# Patient Record
Sex: MALE | Race: BLACK OR AFRICAN AMERICAN | NOT HISPANIC OR LATINO
[De-identification: names, ages, dates, MRNs, and addresses within clinical notes are randomized per-mention and may not be internally consistent; named-entity substitution may affect disease eponyms.]

---

## 2017-01-18 ENCOUNTER — APPOINTMENT (OUTPATIENT)
Dept: WOUND CARE | Facility: CLINIC | Age: 37
End: 2017-01-18

## 2017-01-27 ENCOUNTER — APPOINTMENT (OUTPATIENT)
Dept: WOUND CARE | Facility: CLINIC | Age: 37
End: 2017-01-27

## 2017-02-03 ENCOUNTER — APPOINTMENT (OUTPATIENT)
Dept: WOUND CARE | Facility: CLINIC | Age: 37
End: 2017-02-03

## 2017-03-03 ENCOUNTER — APPOINTMENT (OUTPATIENT)
Dept: WOUND CARE | Facility: CLINIC | Age: 37
End: 2017-03-03

## 2017-03-03 DIAGNOSIS — L97.521 NON-PRESSURE CHRONIC ULCER OF OTHER PART OF LEFT FOOT LIMITED TO BREAKDOWN OF SKIN: ICD-10-CM

## 2017-04-07 ENCOUNTER — APPOINTMENT (OUTPATIENT)
Dept: WOUND CARE | Facility: CLINIC | Age: 37
End: 2017-04-07

## 2017-04-07 DIAGNOSIS — E11.621 TYPE 2 DIABETES MELLITUS WITH FOOT ULCER: ICD-10-CM

## 2017-04-07 DIAGNOSIS — L97.512 NON-PRESSURE CHRONIC ULCER OF OTHER PART OF RIGHT FOOT WITH FAT LAYER EXPOSED: ICD-10-CM

## 2017-04-07 DIAGNOSIS — L97.409 TYPE 2 DIABETES MELLITUS WITH FOOT ULCER: ICD-10-CM

## 2017-04-19 PROBLEM — L97.521 CHRONIC FOOT ULCER, LEFT, LIMITED TO BREAKDOWN OF SKIN: Status: ACTIVE | Noted: 2017-04-19

## 2017-09-01 ENCOUNTER — APPOINTMENT (OUTPATIENT)
Dept: MRI IMAGING | Facility: CLINIC | Age: 37
End: 2017-09-01
Payer: COMMERCIAL

## 2017-09-01 ENCOUNTER — OUTPATIENT (OUTPATIENT)
Dept: OUTPATIENT SERVICES | Facility: HOSPITAL | Age: 37
LOS: 1 days | End: 2017-09-01
Payer: COMMERCIAL

## 2017-09-01 DIAGNOSIS — Z00.8 ENCOUNTER FOR OTHER GENERAL EXAMINATION: ICD-10-CM

## 2017-09-01 PROCEDURE — 73720 MRI LWR EXTREMITY W/O&W/DYE: CPT

## 2017-09-01 PROCEDURE — 73720 MRI LWR EXTREMITY W/O&W/DYE: CPT | Mod: 26,RT

## 2017-09-01 PROCEDURE — 82565 ASSAY OF CREATININE: CPT

## 2017-09-01 PROCEDURE — A9585: CPT

## 2020-05-29 ENCOUNTER — RX RENEWAL (OUTPATIENT)
Age: 40
End: 2020-05-29

## 2021-01-10 ENCOUNTER — TRANSCRIPTION ENCOUNTER (OUTPATIENT)
Age: 41
End: 2021-01-10

## 2021-01-11 ENCOUNTER — INPATIENT (INPATIENT)
Facility: HOSPITAL | Age: 41
LOS: 0 days | Discharge: HOME | DRG: 617 | End: 2021-01-12
Attending: INTERNAL MEDICINE | Admitting: INTERNAL MEDICINE
Payer: COMMERCIAL

## 2021-01-11 ENCOUNTER — TRANSCRIPTION ENCOUNTER (OUTPATIENT)
Age: 41
End: 2021-01-11

## 2021-01-11 VITALS
HEART RATE: 112 BPM | HEIGHT: 74 IN | RESPIRATION RATE: 14 BRPM | WEIGHT: 265 LBS | TEMPERATURE: 98 F | OXYGEN SATURATION: 99 % | SYSTOLIC BLOOD PRESSURE: 226 MMHG | DIASTOLIC BLOOD PRESSURE: 118 MMHG

## 2021-01-11 DIAGNOSIS — E11.621 TYPE 2 DIABETES MELLITUS WITH FOOT ULCER: ICD-10-CM

## 2021-01-11 LAB
ANION GAP SERPL CALC-SCNC: 17 MMOL/L — SIGNIFICANT CHANGE UP (ref 5–17)
APTT BLD: 32.7 SEC — SIGNIFICANT CHANGE UP (ref 27.5–35.5)
BLD GP AB SCN SERPL QL: NEGATIVE — SIGNIFICANT CHANGE UP
BUN SERPL-MCNC: 19 MG/DL — SIGNIFICANT CHANGE UP (ref 7–23)
CALCIUM SERPL-MCNC: 9.4 MG/DL — SIGNIFICANT CHANGE UP (ref 8.4–10.5)
CHLORIDE SERPL-SCNC: 96 MMOL/L — SIGNIFICANT CHANGE UP (ref 96–108)
CO2 SERPL-SCNC: 23 MMOL/L — SIGNIFICANT CHANGE UP (ref 22–31)
CREAT SERPL-MCNC: 1.11 MG/DL — SIGNIFICANT CHANGE UP (ref 0.5–1.3)
GLUCOSE BLDC GLUCOMTR-MCNC: 80 MG/DL — SIGNIFICANT CHANGE UP (ref 70–99)
GLUCOSE BLDC GLUCOMTR-MCNC: 99 MG/DL — SIGNIFICANT CHANGE UP (ref 70–99)
GLUCOSE SERPL-MCNC: 102 MG/DL — HIGH (ref 70–99)
HCT VFR BLD CALC: 48.6 % — SIGNIFICANT CHANGE UP (ref 39–50)
HGB BLD-MCNC: 16.6 G/DL — SIGNIFICANT CHANGE UP (ref 13–17)
INR BLD: 0.97 RATIO — SIGNIFICANT CHANGE UP (ref 0.88–1.16)
MCHC RBC-ENTMCNC: 30.2 PG — SIGNIFICANT CHANGE UP (ref 27–34)
MCHC RBC-ENTMCNC: 34.2 GM/DL — SIGNIFICANT CHANGE UP (ref 32–36)
MCV RBC AUTO: 88.5 FL — SIGNIFICANT CHANGE UP (ref 80–100)
NRBC # BLD: 0 /100 WBCS — SIGNIFICANT CHANGE UP (ref 0–0)
PLATELET # BLD AUTO: 355 K/UL — SIGNIFICANT CHANGE UP (ref 150–400)
POTASSIUM SERPL-MCNC: 3.9 MMOL/L — SIGNIFICANT CHANGE UP (ref 3.5–5.3)
POTASSIUM SERPL-SCNC: 3.9 MMOL/L — SIGNIFICANT CHANGE UP (ref 3.5–5.3)
PROTHROM AB SERPL-ACNC: 11.6 SEC — SIGNIFICANT CHANGE UP (ref 10.6–13.6)
RBC # BLD: 5.49 M/UL — SIGNIFICANT CHANGE UP (ref 4.2–5.8)
RBC # FLD: 12.5 % — SIGNIFICANT CHANGE UP (ref 10.3–14.5)
RH IG SCN BLD-IMP: POSITIVE — SIGNIFICANT CHANGE UP
SARS-COV-2 RNA SPEC QL NAA+PROBE: SIGNIFICANT CHANGE UP
SODIUM SERPL-SCNC: 136 MMOL/L — SIGNIFICANT CHANGE UP (ref 135–145)
WBC # BLD: 10.43 K/UL — SIGNIFICANT CHANGE UP (ref 3.8–10.5)
WBC # FLD AUTO: 10.43 K/UL — SIGNIFICANT CHANGE UP (ref 3.8–10.5)

## 2021-01-11 PROCEDURE — 71045 X-RAY EXAM CHEST 1 VIEW: CPT | Mod: 26

## 2021-01-11 PROCEDURE — 99285 EMERGENCY DEPT VISIT HI MDM: CPT

## 2021-01-11 PROCEDURE — 93010 ELECTROCARDIOGRAM REPORT: CPT

## 2021-01-11 PROCEDURE — 73620 X-RAY EXAM OF FOOT: CPT | Mod: 26,RT

## 2021-01-11 PROCEDURE — 73630 X-RAY EXAM OF FOOT: CPT | Mod: 26,RT

## 2021-01-11 RX ORDER — PIPERACILLIN AND TAZOBACTAM 4; .5 G/20ML; G/20ML
3.38 INJECTION, POWDER, LYOPHILIZED, FOR SOLUTION INTRAVENOUS EVERY 8 HOURS
Refills: 0 | Status: DISCONTINUED | OUTPATIENT
Start: 2021-01-11 | End: 2021-01-11

## 2021-01-11 RX ORDER — OXYCODONE AND ACETAMINOPHEN 5; 325 MG/1; MG/1
1 TABLET ORAL EVERY 4 HOURS
Refills: 0 | Status: DISCONTINUED | OUTPATIENT
Start: 2021-01-11 | End: 2021-01-12

## 2021-01-11 RX ORDER — DEXTROSE 50 % IN WATER 50 %
15 SYRINGE (ML) INTRAVENOUS ONCE
Refills: 0 | Status: DISCONTINUED | OUTPATIENT
Start: 2021-01-11 | End: 2021-01-12

## 2021-01-11 RX ORDER — DEXTROSE 50 % IN WATER 50 %
25 SYRINGE (ML) INTRAVENOUS ONCE
Refills: 0 | Status: DISCONTINUED | OUTPATIENT
Start: 2021-01-11 | End: 2021-01-12

## 2021-01-11 RX ORDER — DEXTROSE 50 % IN WATER 50 %
12.5 SYRINGE (ML) INTRAVENOUS ONCE
Refills: 0 | Status: DISCONTINUED | OUTPATIENT
Start: 2021-01-11 | End: 2021-01-12

## 2021-01-11 RX ORDER — SODIUM CHLORIDE 9 MG/ML
1000 INJECTION, SOLUTION INTRAVENOUS
Refills: 0 | Status: DISCONTINUED | OUTPATIENT
Start: 2021-01-11 | End: 2021-01-11

## 2021-01-11 RX ORDER — METFORMIN HYDROCHLORIDE 850 MG/1
1 TABLET ORAL
Qty: 0 | Refills: 0 | DISCHARGE

## 2021-01-11 RX ORDER — INSULIN LISPRO 100/ML
VIAL (ML) SUBCUTANEOUS
Refills: 0 | Status: DISCONTINUED | OUTPATIENT
Start: 2021-01-11 | End: 2021-01-12

## 2021-01-11 RX ORDER — METOPROLOL TARTRATE 50 MG
25 TABLET ORAL
Refills: 0 | Status: DISCONTINUED | OUTPATIENT
Start: 2021-01-11 | End: 2021-01-12

## 2021-01-11 RX ORDER — PIPERACILLIN AND TAZOBACTAM 4; .5 G/20ML; G/20ML
3.38 INJECTION, POWDER, LYOPHILIZED, FOR SOLUTION INTRAVENOUS EVERY 8 HOURS
Refills: 0 | Status: DISCONTINUED | OUTPATIENT
Start: 2021-01-11 | End: 2021-01-12

## 2021-01-11 RX ORDER — ATORVASTATIN CALCIUM 80 MG/1
10 TABLET, FILM COATED ORAL AT BEDTIME
Refills: 0 | Status: DISCONTINUED | OUTPATIENT
Start: 2021-01-11 | End: 2021-01-12

## 2021-01-11 RX ORDER — ENOXAPARIN SODIUM 100 MG/ML
40 INJECTION SUBCUTANEOUS DAILY
Refills: 0 | Status: DISCONTINUED | OUTPATIENT
Start: 2021-01-11 | End: 2021-01-11

## 2021-01-11 RX ORDER — ACETAMINOPHEN 500 MG
650 TABLET ORAL EVERY 6 HOURS
Refills: 0 | Status: DISCONTINUED | OUTPATIENT
Start: 2021-01-11 | End: 2021-01-12

## 2021-01-11 RX ORDER — INSULIN LISPRO 100/ML
VIAL (ML) SUBCUTANEOUS AT BEDTIME
Refills: 0 | Status: DISCONTINUED | OUTPATIENT
Start: 2021-01-11 | End: 2021-01-12

## 2021-01-11 RX ORDER — GLUCAGON INJECTION, SOLUTION 0.5 MG/.1ML
1 INJECTION, SOLUTION SUBCUTANEOUS ONCE
Refills: 0 | Status: DISCONTINUED | OUTPATIENT
Start: 2021-01-11 | End: 2021-01-12

## 2021-01-11 RX ORDER — FENTANYL CITRATE 50 UG/ML
25 INJECTION INTRAVENOUS
Refills: 0 | Status: DISCONTINUED | OUTPATIENT
Start: 2021-01-11 | End: 2021-01-11

## 2021-01-11 RX ORDER — VANCOMYCIN HCL 1 G
1000 VIAL (EA) INTRAVENOUS ONCE
Refills: 0 | Status: COMPLETED | OUTPATIENT
Start: 2021-01-11 | End: 2021-01-11

## 2021-01-11 RX ORDER — EMPAGLIFLOZIN 10 MG/1
1 TABLET, FILM COATED ORAL
Qty: 0 | Refills: 0 | DISCHARGE

## 2021-01-11 RX ORDER — SODIUM CHLORIDE 9 MG/ML
1000 INJECTION, SOLUTION INTRAVENOUS
Refills: 0 | Status: DISCONTINUED | OUTPATIENT
Start: 2021-01-11 | End: 2021-01-12

## 2021-01-11 RX ORDER — ONDANSETRON 8 MG/1
4 TABLET, FILM COATED ORAL ONCE
Refills: 0 | Status: DISCONTINUED | OUTPATIENT
Start: 2021-01-11 | End: 2021-01-11

## 2021-01-11 RX ORDER — LOSARTAN POTASSIUM 100 MG/1
50 TABLET, FILM COATED ORAL DAILY
Refills: 0 | Status: DISCONTINUED | OUTPATIENT
Start: 2021-01-11 | End: 2021-01-12

## 2021-01-11 RX ORDER — DULAGLUTIDE 4.5 MG/.5ML
0 INJECTION, SOLUTION SUBCUTANEOUS
Qty: 0 | Refills: 0 | DISCHARGE

## 2021-01-11 RX ORDER — FENTANYL CITRATE 50 UG/ML
50 INJECTION INTRAVENOUS
Refills: 0 | Status: DISCONTINUED | OUTPATIENT
Start: 2021-01-11 | End: 2021-01-11

## 2021-01-11 RX ORDER — PIPERACILLIN AND TAZOBACTAM 4; .5 G/20ML; G/20ML
3.38 INJECTION, POWDER, LYOPHILIZED, FOR SOLUTION INTRAVENOUS ONCE
Refills: 0 | Status: COMPLETED | OUTPATIENT
Start: 2021-01-11 | End: 2021-01-11

## 2021-01-11 RX ORDER — LOSARTAN POTASSIUM 100 MG/1
50 TABLET, FILM COATED ORAL ONCE
Refills: 0 | Status: COMPLETED | OUTPATIENT
Start: 2021-01-11 | End: 2021-01-11

## 2021-01-11 RX ADMIN — SODIUM CHLORIDE 75 MILLILITER(S): 9 INJECTION, SOLUTION INTRAVENOUS at 21:35

## 2021-01-11 RX ADMIN — LOSARTAN POTASSIUM 50 MILLIGRAM(S): 100 TABLET, FILM COATED ORAL at 10:43

## 2021-01-11 RX ADMIN — PIPERACILLIN AND TAZOBACTAM 200 GRAM(S): 4; .5 INJECTION, POWDER, LYOPHILIZED, FOR SOLUTION INTRAVENOUS at 10:43

## 2021-01-11 RX ADMIN — Medication 25 MILLIGRAM(S): at 17:29

## 2021-01-11 RX ADMIN — PIPERACILLIN AND TAZOBACTAM 25 GRAM(S): 4; .5 INJECTION, POWDER, LYOPHILIZED, FOR SOLUTION INTRAVENOUS at 17:43

## 2021-01-11 RX ADMIN — Medication 250 MILLIGRAM(S): at 12:05

## 2021-01-11 NOTE — DISCHARGE NOTE PROVIDER - CARE PROVIDER_API CALL
Isaiah Shetty (DPM)  Podiatric Medicine and Surgery  [address] [place] [address]  Phone: [phone]  Fax: [phone]  Follow Up Time:    Isaiah Shetty (DPM)  Podiatric Medicine and Surgery  [address] [facility], [place] [address]  Phone: [phone]  Fax: [phone]  Follow Up Time:     Markus Ann  CARDIOVASCULAR DISEASE  [address] [facility], [address]  [place] [address]  Phone: [phone]  Fax: [phone]  Follow Up Time:

## 2021-01-11 NOTE — DISCHARGE NOTE PROVIDER - HOSPITAL COURSE
41 y/o M w/ chronic R foot sub metatarsal 2 wound, s/p R foot 2nd metatarsal head resection ,sutures intact w/ no dehiscence, no hematoma, no drainage, stable w/ no acute signs of infection   - per intraoperative findings, low concern for residual infection  - pod stable for discharge  Pt. is cleared for discharge with close outpatient follow up.

## 2021-01-11 NOTE — PACU DISCHARGE NOTE - COMMENTS
BP within patient's typical range. No CP, SOB, FND. Primary team evaluated pt's elevated bp. No need for aggressive tx prior to PACU discharge, given patient's baseline pressure.

## 2021-01-11 NOTE — DISCHARGE NOTE PROVIDER - NSDCCPCAREPLAN_GEN_ALL_CORE_FT
PRINCIPAL DISCHARGE DIAGNOSIS  Diagnosis: Diabetic foot ulcer  Assessment and Plan of Treatment: s/p resection  cleared by podiarty         SECONDARY DISCHARGE DIAGNOSES  Diagnosis: Diabetes mellitus  Assessment and Plan of Treatment: HgA1C this admission.  Make sure you get your HgA1c checked every three months.  If you take oral diabetes medications, check your blood glucose two times a day.  If you take insulin, check your blood glucose before meals and at bedtime.  It's important not to skip any meals.  Keep a log of your blood glucose results and always take it with you to your doctor appointments.  Keep a list of your current medications including injectables and over the counter medications and bring this medication list with you to all your doctor appointments.  If you have not seen your ophthalmologist this year call for appointment.  Check your feet daily for redness, sores, or openings. Do not self treat. If no improvement in two days call your primary care physician for an appointment.  Low blood sugar (hypoglycemia) is a blood sugar below 70mg/dl. Check your blood sugar if you feel signs/symptoms of hypoglycemia. If your blood sugar is below 70 take 15 grams of carbohydrates (ex 4 oz of apple juice, 3-4 glucose tablets, or 4-6 oz of regular soda) wait 15 minutes and repeat blood sugar to make sure it comes up above 70.  If your blood sugar is above 70 and you are due for a meal, have a meal.  If you are not due for a meal have a snack.  This snack helps keeps your blood sugar at a safe range.      Diagnosis: Hypertension  Assessment and Plan of Treatment: Follow up with your medical doctor to establish long term blood pressure treatment goals.

## 2021-01-11 NOTE — H&P ADULT - ASSESSMENT
41 y/o M with pmhx of DM2 and HTN. Pt present for surgery for an ulcer at the distal tip of his R 2nd metatarsal bone which is concerning for osteomyelitis. Pt was on outpatient cefadroxil. Pt says this ulcer has been present for at least 5 years. Pt denies any recent fevers, chills, nausea, vomiting, chest pain, sob. Pt denies any purulent discharge from ulcer site. Pt has not taken his hypertensive meds in 2 days due to insurance issues (Losartan 50mg QD). Pt's PCP is Dr. Ryne Doll. Pt's DPM is Dr. Isaiah Shetty who recommends admission to Dr. Ann for this hospital visit.  pt denies of any chest pain, sob with no hx of cad.  htn, will adjust bp meds add beta blocker/losartan  continue fs with coverage  abx  echo check wall motion  dvt prophylaxis  will add lipitoe  tsh  lipid panel 39 y/o M with pmhx of DM2 and HTN. Pt present for surgery for an ulcer at the distal tip of his R 2nd metatarsal bone which is concerning for osteomyelitis. Pt was on outpatient cefadroxil. Pt says this ulcer has been present for at least 5 years. Pt denies any recent fevers, chills, nausea, vomiting, chest pain, sob. Pt denies any purulent discharge from ulcer site. Pt has not taken his hypertensive meds in 2 days due to insurance issues (Losartan 50mg QD).  pt denies of any chest pain, sob with no hx of cad,   htn, will adjust bp meds add beta blocker/losartan  continue fs with coverage  abx  dvt prophylaxis  will add Lipitor  tsh  lipid panel  pt is clear for surgery, with mild  to md risk  continue and observe bp closely

## 2021-01-11 NOTE — ED ADULT NURSE NOTE - OBJECTIVE STATEMENT
39 y/o M pt w/ pmh of DM, HTN, present to ED from podiatry for surgery for possible osteomyelitis, pt has diabetic ulcer to bottom right foot, which is not healing, bone is sticking out, schedule to have surgery to remove right 2nd metatarsal bone, on exam pt A&Ox3, breathing spontaneous, unlabored w/o distress on room air, NAD, denies pain, fever, chills, numbness, tingling, wound wrapped, pt hypertensive 206/106, b/p meds given, seen and eval by MD, reinaldo placed, labs drawn and sent

## 2021-01-11 NOTE — ED PROVIDER NOTE - CLINICAL SUMMARY MEDICAL DECISION MAKING FREE TEXT BOX
juany -41 yo m with ho dm ( controlled with meds ) fs today 100's - with nonhealing foot ulcer no fever no chills - no change in pain - sent in for surgery and possible debridement - consult podiatry pt on po abx - change to IV - xr r/o gas no suspicion based on exam no crepitus no dc, minimal redness - ttp - xr r/o osteo

## 2021-01-11 NOTE — BRIEF OPERATIVE NOTE - OPERATION/FINDINGS
10cc EBL   Low concern residual infection  Low concern viability   Strong healthy appearing metatarsal bone

## 2021-01-11 NOTE — CONSULT NOTE ADULT - ASSESSMENT
39 y/o M     h/o HTN/  non complaint with meds. pcd/  c/c  right foot ulcer    w/ chronic R foot , 2nd toe, sub metatarsal 2wound      - R foot sub met   ulcer , which is c hronic non healing wound with likely underlying OM  on zosyn   for  resection of right foot, 2nd MT head on 1/11   sbp  is 203/106    on bp meds, on lopressor/ cozaar  follow  bp  curve  on statin   dvt ppx/  echo     39 y/o M     h/o HTN/  non complaint with meds. pcd/  c/c  right foot ulcer    w/ chronic R foot , 2nd toe, sub metatarsal 2wound      - R foot sub met   ulcer , which is c hronic non healing wound with likely underlying OM  on zosyn   for  resection of right foot, 2nd MT head on 1/11   sbp  is 203/106    on bp meds, on lopressor/ cozaar  follow  bp  curve  if sbp is Under  160, then  cleared  for OR  on statin   dvt ppx/  echo

## 2021-01-11 NOTE — DISCHARGE NOTE PROVIDER - NSDCMRMEDTOKEN_GEN_ALL_CORE_FT
cefadroxil 500 mg oral capsule: 1 tab(s) orally 2 times a day  Jardiance 25 mg oral tablet: 1 tab(s) orally once a day  losartan 50 mg oral tablet: 1 tab(s) orally once a day  NOTE: PHARMACY HAD 100MG AS PER PATIENT 50MG.  metFORMIN 1000 mg oral tablet: 1 tab(s) orally 2 times a day  Trulicity Pen 1.5 mg/0.5 mL subcutaneous solution: inject once a week on sundays as directed   atorvastatin 10 mg oral tablet: 1 tab(s) orally once a day (at bedtime)  hydroCHLOROthiazide 12.5 mg oral capsule: 1 cap(s) orally once a day   Jardiance 25 mg oral tablet: 1 tab(s) orally once a day  losartan 100 mg oral tablet: 1 tab(s) orally once a day   losartan 50 mg oral tablet: 1 tab(s) orally once a day  NOTE: PHARMACY HAD 100MG AS PER PATIENT 50MG.  metFORMIN 1000 mg oral tablet: 1 tab(s) orally 2 times a day  metoprolol tartrate 25 mg oral tablet: 1 tab(s) orally 2 times a day  Trulicity Pen 1.5 mg/0.5 mL subcutaneous solution: inject once a week on sundays as directed   atorvastatin 10 mg oral tablet: 1 tab(s) orally once a day (at bedtime)  Jardiance 25 mg oral tablet: 1 tab(s) orally once a day  losartan-hydrochlorothiazide 100 mg-25 mg oral tablet: 1 tab(s) orally once a day   metFORMIN 1000 mg oral tablet: 1 tab(s) orally 2 times a day  Metoprolol Succinate ER 50 mg oral tablet, extended release: 1 tab(s) orally once a day   Trulicity Pen 1.5 mg/0.5 mL subcutaneous solution: inject once a week on sundays as directed

## 2021-01-11 NOTE — CONSULT NOTE ADULT - SUBJECTIVE AND OBJECTIVE BOX
Podiatry pager #: [phone]/ [phone]    Patient is a 40y old  Male who presents with a chief complaint of R foot wound     HPI:  41 y/o M with pmhx of DM2 and HTN. Pt present for surgery for an ulcer at the distal tip of his R 2nd metatarsal bone which is concerning for osteomyelitis. Pt was on outpatient cefadroxil. Pt says this ulcer has been present for at least 5 years. Pt denies any recent fevers, chills, nausea, vomiting, chest pain, sob. Pt denies any purulent discharge from ulcer site. Pt has not taken his hypertensive meds in 2 days due to insurance issues (Losartan 50mg QD). Pt's PCP is Dr. Rnye Doll. Pt's DPM is Dr. Isaiah Shetty who recommends admission to Dr. Ann for this hospital visit.    PAST MEDICAL & SURGICAL HISTORY:      MEDICATIONS  (STANDING):  vancomycin  IVPB. 1000 milliGRAM(s) IV Intermittent once    MEDICATIONS  (PRN):      Allergies    No Known Allergies    Intolerances        VITALS:    Vital Signs Last 24 Hrs  T(C): 36.9 (11 Jan 2021 09:40), Max: 36.9 (11 Jan 2021 09:40)  T(F): 98.5 (11 Jan 2021 09:40), Max: 98.5 (11 Jan 2021 09:40)  HR: 103 (11 Jan 2021 10:40) (103 - 112)  BP: 203/106 (11 Jan 2021 10:40) (203/106 - 226/118)  BP(mean): --  RR: 16 (11 Jan 2021 10:40) (14 - 16)  SpO2: 100% (11 Jan 2021 10:40) (99% - 100%)    LABS:                CAPILLARY BLOOD GLUCOSE              LOWER EXTREMITY PHYSICAL EXAM:    Vasular: DP/PT 2/4, B/L, CFT intact B/L, Temperature gradient warm to cool, B/L.   Neuro: Epicritic sensation diminished to the level of midfoot, B/L.  Musculoskeletal/Ortho: unremarkable   Derm:  Wound #1:  Location: R foot sub metatarsal 2  Size: 1.5 cm x 1.0 cm   Depth: to capsule  Wound bed: granular  Drainage: none  Odor: none  Periwound: macerated  Etiology: neuropathy     RADIOLOGY & ADDITIONAL STUDIES:        
  HPI:  39 y/o M    with pmhx of DM2 and HTN.    Pt present for surgery for an ulcer at the distal tip of his R 2nd metatarsal bone which is concerning for osteomyelitis. Pt was on outpatient cefadroxil. Pt says this ulcer has been present for at least 5 years   . Pt denies any recent fevers, chills, nausea, vomiting, chest pain, sob.     Pt has not taken his hypertensive meds in 2 days due to insurance issues (Losartan 50mg QD). Pt's PCP is Dr. Ryne Doll. Pt's DPM is Dr. Isaiah Shetty who recommends admission to Dr. Ann for this hospital visit.  pt denies of any chest pain, sob with no hx of cad.  PAST MEDICAL & SURGICAL HISTORY:  htn  pvd    MEDICATIONS  (STANDING):  notesd    MEDICATIONS  (PRN):      FAMILY HISTORY:      SOCIAL HISTORY:    [ ] Non-smoker  [ ] Smoker  [ ] Alcohol    Allergies    No Known Allergies    Intolerances    	    REVIEW OF SYSTEMS:  CONSTITUTIONAL: No fever, weight loss, or fatigue  EYES: No eye pain, visual disturbances, or discharge  ENT:  No difficulty hearing, tinnitus, vertigo; No sinus or throat pain  NECK: No pain or stiffness  RESPIRATORY: No cough, wheezing, chills or hemoptysis; No Shortness of Breath  CARDIOVASCULAR: No chest pain, palpitations, passing out, dizziness, or leg swelling  GASTROINTESTINAL: No abdominal or epigastric pain. No nausea, vomiting, or hematemesis; No diarrhea or constipation. No melena or hematochezia.  GENITOURINARY: No dysuria, frequency, hematuria, or incontinence  NEUROLOGICAL: No headaches, memory loss, loss of strength, numbness, or tremors  SKIN: No itching, burning, rashes, or lesions   LYMPH Nodes: No enlarged glands  ENDOCRINE: No heat or cold intolerance; No hair loss  MUSCULOSKELETAL: No joint pain or swelling; No muscle, back, or extremity pain, +foot ulcer  PSYCHIATRIC: No depression, anxiety, mood swings, or difficulty sleeping  HEME/LYMPH: No easy bruising, or bleeding gums  ALLERGY AND IMMUNOLOGIC: No hives or eczema	    [ ] All others negative	  [ ] Unable to obtain    PHYSICAL EXAM:  T(C): 36.9 (01-11-21 @ 09:40), Max: 36.9 (01-11-21 @ 09:40)  HR: 103 (01-11-21 @ 10:40) (103 - 112)  BP: 203/106 (01-11-21 @ 10:40) (203/106 - 226/118)  RR: 16 (01-11-21 @ 10:40) (14 - 16)  SpO2: 100% (01-11-21 @ 10:40) (99% - 100%)  Wt(kg): --  I&O's Summary      Appearance: Normal	  HEENT:   Normal oral mucosa, PERRL, EOMI	  Lymphatic: No lymphadenopathy  Cardiovascular: Normal S1 S2, No JVD, + murmurs, No edema  Respiratory: Lungs clear to auscultation	  Psychiatry: A & O x 3, Mood & affect appropriate  Gastrointestinal:  Soft, Non-tender, + BS	  Skin: No rashes, No ecchymoses, No cyanosis	  Neurologic: Non-focal  Extremities: Normal range of motion, +foot ulcer 2nd metatarsal bone  Vascular: +pvd    TELEMETRY: 	    ECG:  	  RADIOLOGY:  OTHER: 	  	  LABS:	 	    CARDIAC MARKERS:                              16.6   10.43 )-----------( 355      ( 11 Jan 2021 11:07 )             48.6     01-11    136  |  96  |  19  ----------------------------<  102<H>  3.9   |  23  |  1.11    Ca    9.4      11 Jan 2021 11:33      proBNP:   Lipid Profile:   HgA1c:   TSH:   PT/INR - ( 11 Jan 2021 11:07 )   PT: 11.6 sec;   INR: 0.97 ratio         PTT - ( 11 Jan 2021 11:07 )  PTT:32.7 sec    PREVIOUS DIAGNOSTIC TESTING:      < from: Xray Chest 1 View AP/PA (01.11.21 @ 10:52) >  IMPRESSION:  Clear lungs.           (11 Jan 2021 14:12)      REVIEW OF SYSTEMS:  GEN: no fever,    no chills  RESP: no SOB,   no cough  CVS: no chest pain,   no palpitations  GI: no abdominal pain,   no nausea,   no vomiting,   no constipation,   no diarrhea  : no dysuria,   no frequency  NEURO: no headache,   no dizziness  PSYCH: no depression,   not anxious  Derm : no rash    Allergies    No Known Allergies    Intolerances        CAPILLARY BLOOD GLUCOSE        I&O's Summary      Vital Signs Last 24 Hrs  T(C): 36.9 (11 Jan 2021 09:40), Max: 36.9 (11 Jan 2021 09:40)  T(F): 98.5 (11 Jan 2021 09:40), Max: 98.5 (11 Jan 2021 09:40)  HR: 98 (11 Jan 2021 14:14) (98 - 112)  BP: 151/102 (11 Jan 2021 14:14) (151/102 - 226/118)  BP(mean): --  RR: 16 (11 Jan 2021 14:14) (14 - 16)  SpO2: 100% (11 Jan 2021 14:14) (99% - 100%)  PHYSICAL EXAM:  GENERAL: NAD,   HEAD:  Atraumatic, Normocephalic  EYES: EOMI,  NECK: Supple, No JVD  CHEST/LUNG: Clear to auscultation bilaterally; No wheeze  HEART: Regular rate and rhythm;        murmur  ABDOMEN: Soft, Nontender,   EXTREMITIES: ukcer, right foot  NEUROLOGY:  alert    MEDICATIONS  (STANDING):  losartan 50 milliGRAM(s) Oral daily  metoprolol tartrate 25 milliGRAM(s) Oral two times a day  piperacillin/tazobactam IVPB.. 3.375 Gram(s) IV Intermittent every 8 hours    MEDICATIONS  (PRN):    LABS:                        16.6   10.43 )-----------( 355      ( 11 Jan 2021 11:07 )             48.6     01-11    136  |  96  |  19  ----------------------------<  102<H>  3.9   |  23  |  1.11    Ca    9.4      11 Jan 2021 11:33      PT/INR - ( 11 Jan 2021 11:07 )   PT: 11.6 sec;   INR: 0.97 ratio         PTT - ( 11 Jan 2021 11:07 )  PTT:32.7 sec                        Consultant(s) Notes Reviewed:      Care Discussed with Consultants/Other Providers:  Plan:

## 2021-01-11 NOTE — DISCHARGE NOTE PROVIDER - CARE PROVIDERS DIRECT ADDRESSES
,[email or web address],DirectAddress_Unknown

## 2021-01-11 NOTE — DISCHARGE NOTE PROVIDER - NSDCFUADDAPPT_GEN_ALL_CORE_FT
Please f/u with Dr. Ann   Please take blood pressure in am before breakfast and blood presssure in evening before dinner  Please Call Dr. Ann with information [phone]

## 2021-01-11 NOTE — ED PROVIDER NOTE - OBJECTIVE STATEMENT
41 y/o M with pmhx of DM2 and HTN. Pt present for surgery for an ulcer at the distal tip of his R 2nd metatarsal bone which is concerning for osteomyelitis. Pt was on outpatient cefadroxil. Pt says this ulcer has been present for at least 5 years. Pt denies any recent fevers, chills, nausea, vomiting, chest pain, sob. Pt denies any purulent discharge from ulcer site. Pt has not taken his hypertensive meds in 2 days due to insurance issues (Losartan 50mg QD). Pt's PCP is Dr. Ryne Doll. Pt's DPM is Dr. Isaiah Shetty who recommends admission to Dr. Ann for this hospital visit.

## 2021-01-11 NOTE — BRIEF OPERATIVE NOTE - SPECIMENS
Pathology: right foot 2nd met clean bone, right foot 2nd met dirty bone; Micro: right foot 2nd met clean bone, right foot 2nd met dirty bone

## 2021-01-11 NOTE — DISCHARGE NOTE PROVIDER - PROVIDER TOKENS
PROVIDER:[TOKEN:[[id number]]] PROVIDER:[TOKEN:[[id number]]],PROVIDER:[TOKEN:[[id number]]]

## 2021-01-11 NOTE — ED PROVIDER NOTE - PHYSICAL EXAMINATION
CONSTITUTIONAL: Well-developed; well-nourished; in no acute distress.   SKIN: warm, dry  HEAD: Normocephalic; atraumatic.  EYES: no conjunctival injection.   ENT: No nasal discharge; airway clear.  NECK: Supple; non tender.  CARD: S1, S2 normal; no murmurs, gallops, or rubs. Regular rate and rhythm.   RESP: No wheezes, rales or rhonchi. Good air movement bilaterally.   ABD: soft ntnd, no guarding, no distention, no rigidity.   Ext: Ulcer seen at distal tip base of 2nd R metatarsal. Skin is broken. No purulent discharge.   NEURO: Alert, oriented, grossly unremarkable  PSYCH: Cooperative, appropriate. CONSTITUTIONAL: Well-developed; well-nourished; in no acute distress.   SKIN: warm, dry  HEAD: Normocephalic; atraumatic.  EYES: no conjunctival injection.   ENT: No nasal discharge; airway clear.  NECK: Supple; non tender.  CARD: S1, S2 normal; no murmurs, gallops, or rubs. Regular rate and rhythm.   RESP: No wheezes, rales or rhonchi. Good air movement bilaterally.   ABD: soft ntnd, no guarding, no distention, no rigidity.   Ext: Ulcer seen at distal tip base of 2nd R metatarsal. Skin is broken. No purulent discharge. Mild erythema around ulcer site.   NEURO: Alert, oriented, grossly unremarkable  PSYCH: Cooperative, appropriate.

## 2021-01-11 NOTE — BRIEF OPERATIVE NOTE - COMMENTS
s/p right foot 2nd metatarsal head resection of osteomyelitis   10cc ebl  Low concern residual infection  Low concern viability   Stable for d/c from podiatric standpoint tomorrow pending medical clearance

## 2021-01-11 NOTE — PRE-ANESTHESIA EVALUATION ADULT - NSANTHPMHFT_GEN_ALL_CORE
39 y/o M w/ PMHx of DM2, uncontrolled HTN, R second toe non-healing wound presents for R foot second metatarsal resection.

## 2021-01-11 NOTE — H&P ADULT - HISTORY OF PRESENT ILLNESS
CHIEF COMPLAINT:Patient is a 40y old  Male who presents with a chief complaint of foot ulcer    HPI:  41 y/o M with pmhx of DM2 and HTN. Pt present for surgery for an ulcer at the distal tip of his R 2nd metatarsal bone which is concerning for osteomyelitis. Pt was on outpatient cefadroxil. Pt says this ulcer has been present for at least 5 years. Pt denies any recent fevers, chills, nausea, vomiting, chest pain, sob. Pt denies any purulent discharge from ulcer site. Pt has not taken his hypertensive meds in 2 days due to insurance issues (Losartan 50mg QD). Pt's PCP is Dr. Ryne Doll. Pt's DPM is Dr. Isaiah Shetty who recommends admission to Dr. Ann for this hospital visit.  pt denies of any chest pain, sob with no hx of cad.    PAST MEDICAL & SURGICAL HISTORY:  htn  pvd    MEDICATIONS  (STANDING):  notesd    MEDICATIONS  (PRN):      FAMILY HISTORY:      SOCIAL HISTORY:    [ ] Non-smoker  [ ] Smoker  [ ] Alcohol    Allergies    No Known Allergies    Intolerances    	    REVIEW OF SYSTEMS:  CONSTITUTIONAL: No fever, weight loss, or fatigue  EYES: No eye pain, visual disturbances, or discharge  ENT:  No difficulty hearing, tinnitus, vertigo; No sinus or throat pain  NECK: No pain or stiffness  RESPIRATORY: No cough, wheezing, chills or hemoptysis; No Shortness of Breath  CARDIOVASCULAR: No chest pain, palpitations, passing out, dizziness, or leg swelling  GASTROINTESTINAL: No abdominal or epigastric pain. No nausea, vomiting, or hematemesis; No diarrhea or constipation. No melena or hematochezia.  GENITOURINARY: No dysuria, frequency, hematuria, or incontinence  NEUROLOGICAL: No headaches, memory loss, loss of strength, numbness, or tremors  SKIN: No itching, burning, rashes, or lesions   LYMPH Nodes: No enlarged glands  ENDOCRINE: No heat or cold intolerance; No hair loss  MUSCULOSKELETAL: No joint pain or swelling; No muscle, back, or extremity pain, +foot ulcer  PSYCHIATRIC: No depression, anxiety, mood swings, or difficulty sleeping  HEME/LYMPH: No easy bruising, or bleeding gums  ALLERGY AND IMMUNOLOGIC: No hives or eczema	    [ ] All others negative	  [ ] Unable to obtain    PHYSICAL EXAM:  T(C): 36.9 (01-11-21 @ 09:40), Max: 36.9 (01-11-21 @ 09:40)  HR: 103 (01-11-21 @ 10:40) (103 - 112)  BP: 203/106 (01-11-21 @ 10:40) (203/106 - 226/118)  RR: 16 (01-11-21 @ 10:40) (14 - 16)  SpO2: 100% (01-11-21 @ 10:40) (99% - 100%)  Wt(kg): --  I&O's Summary      Appearance: Normal	  HEENT:   Normal oral mucosa, PERRL, EOMI	  Lymphatic: No lymphadenopathy  Cardiovascular: Normal S1 S2, No JVD, + murmurs, No edema  Respiratory: Lungs clear to auscultation	  Psychiatry: A & O x 3, Mood & affect appropriate  Gastrointestinal:  Soft, Non-tender, + BS	  Skin: No rashes, No ecchymoses, No cyanosis	  Neurologic: Non-focal  Extremities: Normal range of motion, +foot ulcer 2nd metatarsal bone  Vascular: +pvd    TELEMETRY: 	    ECG:  	  RADIOLOGY:  OTHER: 	  	  LABS:	 	    CARDIAC MARKERS:                              16.6   10.43 )-----------( 355      ( 11 Jan 2021 11:07 )             48.6     01-11    136  |  96  |  19  ----------------------------<  102<H>  3.9   |  23  |  1.11    Ca    9.4      11 Jan 2021 11:33      proBNP:   Lipid Profile:   HgA1c:   TSH:   PT/INR - ( 11 Jan 2021 11:07 )   PT: 11.6 sec;   INR: 0.97 ratio         PTT - ( 11 Jan 2021 11:07 )  PTT:32.7 sec    PREVIOUS DIAGNOSTIC TESTING:      < from: Xray Chest 1 View AP/PA (01.11.21 @ 10:52) >  IMPRESSION:  Clear lungs.

## 2021-01-11 NOTE — CONSULT NOTE ADULT - ASSESSMENT
39 y/o M w/ chronic R foot sub metatarsal 2 wound    - pt seen and evaluated  - afebrile, labs/XR pending  - R foot sub met 2 ulcer to capsule, granular base, stable w/ no acute signs of infection  - Chronic non healing wound with likely underlying OM  - admit to medicine Dr. Ann  - added pt on for R foot 2nd metatarsal head resection today 1/11 after 5 pm w/ Dr. Shetty  - consented/ pre-oped  - keep NPO  - please document medical clearance/optimization for local anesthesia with light sedation   - discussed with Dr. Shetty

## 2021-01-11 NOTE — DISCHARGE NOTE PROVIDER - NSDCFUADDINST_GEN_ALL_CORE_FT
Podiatry Discharge Instructions:  Follow up: Please follow up with Dr. Shetty within 1 week of discharge from the hospital, please call [phone] for appointment and discuss that you recently were seen in the hospital.  Wound Care: Please apply packing to plantar foot wound, dress right foot with 4x4 gauze, abran and ACE daily.   Weight bearing: Please weight bear as tolerated in a surgical shoe.  Antibiotics: Please continue as instructed. Podiatry Discharge Instructions:  Follow up: Please follow up with Dr. Shetty within 1 week of discharge from the hospital, please call [phone] for appointment and discuss that you recently were seen in the hospital.  Wound Care: Please apply Mupirocin to plantar foot wound, dress right foot with 4x4 gauze, abran and ACE every other day   Weight bearing: Please weight bear as tolerated in a surgical shoe to R foot with weight to heel   Antibiotics: Please continue as instructed.

## 2021-01-11 NOTE — ED PROVIDER NOTE - NS ED ROS FT
CONSTITUTIONAL - No fever, No diaphoresis, No weight change  EYES - No eye pain, No blurred vision  ENT - No change in hearing, No sore throat, No neck pain, No rhinorrhea, No ear pain  RESPIRATORY - No shortness of breath, No cough  CARDIAC -No chest pain, No palpitations  GI - No abdominal pain, No nausea, No vomiting, No diarrhea, No constipation  NEUROLOGIC - No numbness, No focal weakness, No headache, No dizziness

## 2021-01-12 ENCOUNTER — RESULT REVIEW (OUTPATIENT)
Age: 41
End: 2021-01-12

## 2021-01-12 ENCOUNTER — TRANSCRIPTION ENCOUNTER (OUTPATIENT)
Age: 41
End: 2021-01-12

## 2021-01-12 VITALS
OXYGEN SATURATION: 100 % | TEMPERATURE: 98 F | RESPIRATION RATE: 20 BRPM | DIASTOLIC BLOOD PRESSURE: 80 MMHG | SYSTOLIC BLOOD PRESSURE: 144 MMHG | HEART RATE: 80 BPM

## 2021-01-12 LAB
A1C WITH ESTIMATED AVERAGE GLUCOSE RESULT: 6.4 % — HIGH (ref 4–5.6)
ALBUMIN SERPL ELPH-MCNC: 4.1 G/DL — SIGNIFICANT CHANGE UP (ref 3.3–5)
ALP SERPL-CCNC: 52 U/L — SIGNIFICANT CHANGE UP (ref 40–120)
ALT FLD-CCNC: 9 U/L — LOW (ref 10–45)
ANION GAP SERPL CALC-SCNC: 14 MMOL/L — SIGNIFICANT CHANGE UP (ref 5–17)
ANION GAP SERPL CALC-SCNC: 14 MMOL/L — SIGNIFICANT CHANGE UP (ref 5–17)
AST SERPL-CCNC: 8 U/L — LOW (ref 10–40)
BILIRUB SERPL-MCNC: 0.7 MG/DL — SIGNIFICANT CHANGE UP (ref 0.2–1.2)
BUN SERPL-MCNC: 15 MG/DL — SIGNIFICANT CHANGE UP (ref 7–23)
BUN SERPL-MCNC: 15 MG/DL — SIGNIFICANT CHANGE UP (ref 7–23)
CALCIUM SERPL-MCNC: 9.1 MG/DL — SIGNIFICANT CHANGE UP (ref 8.4–10.5)
CALCIUM SERPL-MCNC: 9.3 MG/DL — SIGNIFICANT CHANGE UP (ref 8.4–10.5)
CHLORIDE SERPL-SCNC: 100 MMOL/L — SIGNIFICANT CHANGE UP (ref 96–108)
CHLORIDE SERPL-SCNC: 99 MMOL/L — SIGNIFICANT CHANGE UP (ref 96–108)
CHOLEST SERPL-MCNC: 178 MG/DL — SIGNIFICANT CHANGE UP
CO2 SERPL-SCNC: 23 MMOL/L — SIGNIFICANT CHANGE UP (ref 22–31)
CO2 SERPL-SCNC: 24 MMOL/L — SIGNIFICANT CHANGE UP (ref 22–31)
CREAT SERPL-MCNC: 0.98 MG/DL — SIGNIFICANT CHANGE UP (ref 0.5–1.3)
CREAT SERPL-MCNC: 1.02 MG/DL — SIGNIFICANT CHANGE UP (ref 0.5–1.3)
ESTIMATED AVERAGE GLUCOSE: 137 MG/DL — HIGH (ref 68–114)
GLUCOSE BLDC GLUCOMTR-MCNC: 119 MG/DL — HIGH (ref 70–99)
GLUCOSE BLDC GLUCOMTR-MCNC: 93 MG/DL — SIGNIFICANT CHANGE UP (ref 70–99)
GLUCOSE SERPL-MCNC: 92 MG/DL — SIGNIFICANT CHANGE UP (ref 70–99)
GLUCOSE SERPL-MCNC: 94 MG/DL — SIGNIFICANT CHANGE UP (ref 70–99)
GRAM STN FLD: SIGNIFICANT CHANGE UP
GRAM STN FLD: SIGNIFICANT CHANGE UP
HCT VFR BLD CALC: 47 % — SIGNIFICANT CHANGE UP (ref 39–50)
HDLC SERPL-MCNC: 37 MG/DL — LOW
HGB BLD-MCNC: 16.1 G/DL — SIGNIFICANT CHANGE UP (ref 13–17)
LIPID PNL WITH DIRECT LDL SERPL: 119 MG/DL — HIGH
MCHC RBC-ENTMCNC: 30.3 PG — SIGNIFICANT CHANGE UP (ref 27–34)
MCHC RBC-ENTMCNC: 34.3 GM/DL — SIGNIFICANT CHANGE UP (ref 32–36)
MCV RBC AUTO: 88.3 FL — SIGNIFICANT CHANGE UP (ref 80–100)
NON HDL CHOLESTEROL: 141 MG/DL — HIGH
NRBC # BLD: 0 /100 WBCS — SIGNIFICANT CHANGE UP (ref 0–0)
PLATELET # BLD AUTO: 329 K/UL — SIGNIFICANT CHANGE UP (ref 150–400)
POTASSIUM SERPL-MCNC: 3.7 MMOL/L — SIGNIFICANT CHANGE UP (ref 3.5–5.3)
POTASSIUM SERPL-MCNC: 3.7 MMOL/L — SIGNIFICANT CHANGE UP (ref 3.5–5.3)
POTASSIUM SERPL-SCNC: 3.7 MMOL/L — SIGNIFICANT CHANGE UP (ref 3.5–5.3)
POTASSIUM SERPL-SCNC: 3.7 MMOL/L — SIGNIFICANT CHANGE UP (ref 3.5–5.3)
PROT SERPL-MCNC: 7.5 G/DL — SIGNIFICANT CHANGE UP (ref 6–8.3)
RBC # BLD: 5.32 M/UL — SIGNIFICANT CHANGE UP (ref 4.2–5.8)
RBC # FLD: 12.8 % — SIGNIFICANT CHANGE UP (ref 10.3–14.5)
SODIUM SERPL-SCNC: 137 MMOL/L — SIGNIFICANT CHANGE UP (ref 135–145)
SODIUM SERPL-SCNC: 137 MMOL/L — SIGNIFICANT CHANGE UP (ref 135–145)
SPECIMEN SOURCE: SIGNIFICANT CHANGE UP
SPECIMEN SOURCE: SIGNIFICANT CHANGE UP
TRIGL SERPL-MCNC: 109 MG/DL — SIGNIFICANT CHANGE UP
TSH SERPL-MCNC: 1.27 UIU/ML — SIGNIFICANT CHANGE UP (ref 0.27–4.2)
WBC # BLD: 10.91 K/UL — HIGH (ref 3.8–10.5)
WBC # FLD AUTO: 10.91 K/UL — HIGH (ref 3.8–10.5)

## 2021-01-12 PROCEDURE — 93005 ELECTROCARDIOGRAM TRACING: CPT

## 2021-01-12 PROCEDURE — U0003: CPT

## 2021-01-12 PROCEDURE — 87075 CULTR BACTERIA EXCEPT BLOOD: CPT

## 2021-01-12 PROCEDURE — 84443 ASSAY THYROID STIM HORMONE: CPT

## 2021-01-12 PROCEDURE — 86850 RBC ANTIBODY SCREEN: CPT

## 2021-01-12 PROCEDURE — 83036 HEMOGLOBIN GLYCOSYLATED A1C: CPT

## 2021-01-12 PROCEDURE — 85027 COMPLETE CBC AUTOMATED: CPT

## 2021-01-12 PROCEDURE — 71045 X-RAY EXAM CHEST 1 VIEW: CPT

## 2021-01-12 PROCEDURE — 93306 TTE W/DOPPLER COMPLETE: CPT

## 2021-01-12 PROCEDURE — 88305 TISSUE EXAM BY PATHOLOGIST: CPT

## 2021-01-12 PROCEDURE — 96374 THER/PROPH/DIAG INJ IV PUSH: CPT

## 2021-01-12 PROCEDURE — 80061 LIPID PANEL: CPT

## 2021-01-12 PROCEDURE — 88311 DECALCIFY TISSUE: CPT

## 2021-01-12 PROCEDURE — 88311 DECALCIFY TISSUE: CPT | Mod: 26

## 2021-01-12 PROCEDURE — 82962 GLUCOSE BLOOD TEST: CPT

## 2021-01-12 PROCEDURE — 86901 BLOOD TYPING SEROLOGIC RH(D): CPT

## 2021-01-12 PROCEDURE — 80048 BASIC METABOLIC PNL TOTAL CA: CPT

## 2021-01-12 PROCEDURE — 80053 COMPREHEN METABOLIC PANEL: CPT

## 2021-01-12 PROCEDURE — 99285 EMERGENCY DEPT VISIT HI MDM: CPT | Mod: 25

## 2021-01-12 PROCEDURE — U0005: CPT

## 2021-01-12 PROCEDURE — 85730 THROMBOPLASTIN TIME PARTIAL: CPT

## 2021-01-12 PROCEDURE — 93306 TTE W/DOPPLER COMPLETE: CPT | Mod: 26

## 2021-01-12 PROCEDURE — 87070 CULTURE OTHR SPECIMN AEROBIC: CPT

## 2021-01-12 PROCEDURE — 86769 SARS-COV-2 COVID-19 ANTIBODY: CPT

## 2021-01-12 PROCEDURE — 97161 PT EVAL LOW COMPLEX 20 MIN: CPT

## 2021-01-12 PROCEDURE — 73630 X-RAY EXAM OF FOOT: CPT

## 2021-01-12 PROCEDURE — 86900 BLOOD TYPING SEROLOGIC ABO: CPT

## 2021-01-12 PROCEDURE — 85610 PROTHROMBIN TIME: CPT

## 2021-01-12 PROCEDURE — 73620 X-RAY EXAM OF FOOT: CPT

## 2021-01-12 PROCEDURE — 88305 TISSUE EXAM BY PATHOLOGIST: CPT | Mod: 26

## 2021-01-12 RX ORDER — METOPROLOL TARTRATE 50 MG
1 TABLET ORAL
Qty: 60 | Refills: 0
Start: 2021-01-12 | End: 2021-02-10

## 2021-01-12 RX ORDER — LOSARTAN POTASSIUM 100 MG/1
1 TABLET, FILM COATED ORAL
Qty: 0 | Refills: 0 | DISCHARGE

## 2021-01-12 RX ORDER — ENOXAPARIN SODIUM 100 MG/ML
40 INJECTION SUBCUTANEOUS DAILY
Refills: 0 | Status: DISCONTINUED | OUTPATIENT
Start: 2021-01-12 | End: 2021-01-12

## 2021-01-12 RX ORDER — LOSARTAN/HYDROCHLOROTHIAZIDE 100MG-25MG
1 TABLET ORAL
Qty: 30 | Refills: 0
Start: 2021-01-12 | End: 2021-02-10

## 2021-01-12 RX ORDER — HYDROCHLOROTHIAZIDE 25 MG
12.5 TABLET ORAL DAILY
Refills: 0 | Status: DISCONTINUED | OUTPATIENT
Start: 2021-01-12 | End: 2021-01-12

## 2021-01-12 RX ORDER — LOSARTAN POTASSIUM 100 MG/1
1 TABLET, FILM COATED ORAL
Qty: 30 | Refills: 0
Start: 2021-01-12 | End: 2021-02-10

## 2021-01-12 RX ORDER — ATORVASTATIN CALCIUM 80 MG/1
1 TABLET, FILM COATED ORAL
Qty: 30 | Refills: 0
Start: 2021-01-12 | End: 2021-02-10

## 2021-01-12 RX ORDER — LOSARTAN POTASSIUM 100 MG/1
50 TABLET, FILM COATED ORAL ONCE
Refills: 0 | Status: COMPLETED | OUTPATIENT
Start: 2021-01-12 | End: 2021-01-12

## 2021-01-12 RX ORDER — METOPROLOL TARTRATE 50 MG
1 TABLET ORAL
Qty: 30 | Refills: 0
Start: 2021-01-12 | End: 2021-02-10

## 2021-01-12 RX ORDER — HYDROCHLOROTHIAZIDE 25 MG
12.5 TABLET ORAL ONCE
Refills: 0 | Status: COMPLETED | OUTPATIENT
Start: 2021-01-12 | End: 2021-01-12

## 2021-01-12 RX ADMIN — PIPERACILLIN AND TAZOBACTAM 25 GRAM(S): 4; .5 INJECTION, POWDER, LYOPHILIZED, FOR SOLUTION INTRAVENOUS at 09:01

## 2021-01-12 RX ADMIN — PIPERACILLIN AND TAZOBACTAM 25 GRAM(S): 4; .5 INJECTION, POWDER, LYOPHILIZED, FOR SOLUTION INTRAVENOUS at 01:23

## 2021-01-12 RX ADMIN — ENOXAPARIN SODIUM 40 MILLIGRAM(S): 100 INJECTION SUBCUTANEOUS at 13:21

## 2021-01-12 RX ADMIN — LOSARTAN POTASSIUM 50 MILLIGRAM(S): 100 TABLET, FILM COATED ORAL at 06:08

## 2021-01-12 RX ADMIN — LOSARTAN POTASSIUM 50 MILLIGRAM(S): 100 TABLET, FILM COATED ORAL at 14:33

## 2021-01-12 RX ADMIN — Medication 25 MILLIGRAM(S): at 06:08

## 2021-01-12 RX ADMIN — Medication 12.5 MILLIGRAM(S): at 13:21

## 2021-01-12 NOTE — PROGRESS NOTE ADULT - ASSESSMENT
39 y/o M     h/o HTN/  non complaint with meds. pcd/  c/c  right foot ulcer    w/ chronic R foot , 2nd toe, sub metatarsal 2wound      - R foot sub met   ulcer , which is c hronic non healing wound with likely underlying OM   for  resection of right foot, 2nd MT head on 1/11   sbp  [id number], on arrival    on bp meds, on lopressor/ cozaar  follow  bp  curve  on statin   dvt ppx/  echo  on zosyn/  follow   c/s      
39 y/o M w/ chronic R foot sub metatarsal 2 wound, s/p R foot 2nd metatarsal head resection (DOS 1/11/21)    - pt seen and evaluated  - afebrile, wbc 10  - sutures intact w/ no dehiscence, no hematoma, no drainage, stable w/ no acute signs of infection   - per intraoperative findings, low concern for residual infection  - pod stable for discharge  - WBAT to R foot in surgical shoe with weight to heel  - local wound care for R plantar foot wound with Mupirocin and DSD every other day  - remainder of discharge instructions located in follow up portion of discharge paperwork   - seen with attending

## 2021-01-12 NOTE — PHYSICAL THERAPY INITIAL EVALUATION ADULT - MD/RN NOTIFIED
PRINCIPAL PROCEDURE  Procedure: Placement of coronary artery stent  Findings and Treatment: 2 stents to the LAD       yes

## 2021-01-12 NOTE — PHYSICAL THERAPY INITIAL EVALUATION ADULT - PRECAUTIONS/LIMITATIONS, REHAB EVAL
Pt denies any recent fevers, chills, nausea, vomiting, chest pain, sob. Pt denies any purulent discharge from ulcer site. Pt has not taken his hypertensive meds in 2 days due to insurance issues (Losartan 50mg QD). Pt now s/p s/p right foot 2nd metatarsal head resection of osteomyelitis on 1/11, now WBAT to R heel in forefoot offloading shoe./fall precautions

## 2021-01-12 NOTE — SBIRT NOTE ADULT - NSSBIRTCONCERNEDDRINK_GEN_A_CORE
Based on screening guidelines, the remainder of questionnaire does not need to be answered as patient scored less than 4 in first 3 questions.

## 2021-01-12 NOTE — DISCHARGE NOTE NURSING/CASE MANAGEMENT/SOCIAL WORK - PATIENT PORTAL LINK FT
You can access the FollowMyHealth Patient Portal offered by [facility] by registering at the following website: [email or web address]. By joining [facility]’s FollowMyHealth portal, you will also be able to view your health information using other applications (apps) compatible with our system.

## 2021-01-12 NOTE — PHYSICAL THERAPY INITIAL EVALUATION ADULT - ADDITIONAL COMMENTS
Pt lives in a  with his spouse and children +no steps to enter, 8 steps inside with HR. Was ambulating (I) without use of an AD and was (I) with all ADLS PTA

## 2021-01-12 NOTE — SBIRT NOTE ADULT - NSSBIRTDRGBRIEFINTDET_GEN_A_CORE
Patient reports smoking marijuana 3-4 times per week. Patient states that he utilizes this to manage stress and anxiety but also noted that he continues to cut down usage. Patient reports he used to smoke more but is taking steps to cut down and eventually quit usage. LMSW provided education on negative impact of marijuana usage on overall health and wellbeing. LMSW provided education on coping techniques that can be incorporated to better manage symptoms of stress and anxiety. LMSW explored if patient was interested in resources to help quit or further reduce usage but he declines at this time. Patient denies impact on interpersonal relationships or work.

## 2021-01-12 NOTE — PROGRESS NOTE ADULT - SUBJECTIVE AND OBJECTIVE BOX
CARDIOLOGY     PROGRESS  NOTE   ________________________________________________    CHIEF COMPLAINT:Patient is a 40y old  Male who presents with a chief complaint of PVD (12 Jan 2021 08:17)  no complain.  	  REVIEW OF SYSTEMS:  CONSTITUTIONAL: No fever, weight loss, or fatigue  EYES: No eye pain, visual disturbances, or discharge  ENT:  No difficulty hearing, tinnitus, vertigo; No sinus or throat pain  NECK: No pain or stiffness  RESPIRATORY: No cough, wheezing, chills or hemoptysis; No Shortness of Breath  CARDIOVASCULAR: No chest pain, palpitations, passing out, dizziness, or leg swelling  GASTROINTESTINAL: No abdominal or epigastric pain. No nausea, vomiting, or hematemesis; No diarrhea or constipation. No melena or hematochezia.  GENITOURINARY: No dysuria, frequency, hematuria, or incontinence  NEUROLOGICAL: No headaches, memory loss, loss of strength, numbness, or tremors  SKIN: No itching, burning, rashes, or lesions   LYMPH Nodes: No enlarged glands  ENDOCRINE: No heat or cold intolerance; No hair loss  MUSCULOSKELETAL: No joint pain or swelling; No muscle, back, or extremity pain  PSYCHIATRIC: No depression, anxiety, mood swings, or difficulty sleeping  HEME/LYMPH: No easy bruising, or bleeding gums  ALLERGY AND IMMUNOLOGIC: No hives or eczema	    [ ] All others negative	  [ ] Unable to obtain    PHYSICAL EXAM:  T(C): 36.9 (01-12-21 @ 09:24), Max: 37.1 (01-11-21 @ 23:58)  HR: 96 (01-12-21 @ 09:24) (85 - 112)  BP: 150/88 (01-12-21 @ 09:24) (139/81 - 226/118)  RR: 20 (01-12-21 @ 09:24) (14 - 20)  SpO2: 99% (01-12-21 @ 09:24) (98% - 100%)  Wt(kg): --  I&O's Summary    11 Jan 2021 07:01  -  12 Jan 2021 07:00  --------------------------------------------------------  IN: 250 mL / OUT: 800 mL / NET: -550 mL        Appearance: Normal	  HEENT:   Normal oral mucosa, PERRL, EOMI	  Lymphatic: No lymphadenopathy  Cardiovascular: Normal S1 S2, No JVD, + murmurs, No edema  Respiratory: Lungs clear to auscultation	  Psychiatry: A & O x 3, Mood & affect appropriate  Gastrointestinal:  Soft, Non-tender, + BS	  Skin: No rashes, No ecchymoses, No cyanosis	  Neurologic: Non-focal  Extremities: Normal range of motion, No clubbing, cyanosis or edema  Vascular: Peripheral pulses palpable 2+ bilaterally    MEDICATIONS  (STANDING):  atorvastatin 10 milliGRAM(s) Oral at bedtime  dextrose 40% Gel 15 Gram(s) Oral once  dextrose 5%. 1000 milliLiter(s) (50 mL/Hr) IV Continuous <Continuous>  dextrose 5%. 1000 milliLiter(s) (100 mL/Hr) IV Continuous <Continuous>  dextrose 50% Injectable 25 Gram(s) IV Push once  dextrose 50% Injectable 12.5 Gram(s) IV Push once  dextrose 50% Injectable 25 Gram(s) IV Push once  glucagon  Injectable 1 milliGRAM(s) IntraMuscular once  insulin lispro (ADMELOG) corrective regimen sliding scale   SubCutaneous three times a day before meals  insulin lispro (ADMELOG) corrective regimen sliding scale   SubCutaneous at bedtime  losartan 50 milliGRAM(s) Oral daily  metoprolol tartrate 25 milliGRAM(s) Oral two times a day  piperacillin/tazobactam IVPB.. 3.375 Gram(s) IV Intermittent every 8 hours      TELEMETRY: 	    ECG:  	  RADIOLOGY:  OTHER: 	  	  LABS:	 	    CARDIAC MARKERS:                                16.1   10.91 )-----------( 329      ( 12 Jan 2021 06:23 )             47.0     01-12    137  |  99  |  15  ----------------------------<  94  3.7   |  24  |  1.02    Ca    9.3      12 Jan 2021 06:23    TPro  7.5  /  Alb  4.1  /  TBili  0.7  /  DBili  x   /  AST  8<L>  /  ALT  9<L>  /  AlkPhos  52  01-12    proBNP:   Lipid Profile:   HgA1c:   TSH:   PT/INR - ( 11 Jan 2021 11:07 )   PT: 11.6 sec;   INR: 0.97 ratio         PTT - ( 11 Jan 2021 11:07 )  PTT:32.7 sec  PACU to floor  Stable  s/p right foot 2nd metatarsal head resection of osteomyelitis   10cc ebl  Low concern residual infection  Low concern viability   Stable for d/c from podiatric standpoint tomorrow pending medical clearance      Assessment and plan  ---------------------------  41 y/o M with pmhx of DM2 and HTN. Pt present for surgery for an ulcer at the distal tip of his R 2nd metatarsal bone which is concerning for osteomyelitis. Pt was on outpatient cefadroxil. Pt says this ulcer has been present for at least 5 years. Pt denies any recent fevers, chills, nausea, vomiting, chest pain, sob. Pt denies any purulent discharge from ulcer site. Pt has not taken his hypertensive meds in 2 days due to insurance issues (Losartan 50mg QD).  pt denies of any chest pain, sob with no hx of cad,   htn, will adjust bp meds add beta blocker/losartan  continue fs with coverage  s/p surgery  plan as per podiatry ?abx as per POD  will adjust bp meds dvt prophylaxis  cardiac work up as out pt    	        
  afebrile  REVIEW OF SYSTEMS:  GEN: no fever,    no chills  RESP: no SOB,   no cough  CVS: no chest pain,   no palpitations  GI: no abdominal pain,   no nausea,   no vomiting,   no constipation,   no diarrhea  : no dysuria,   no frequency  NEURO: no headache,   no dizziness  PSYCH: no depression,   not anxious  Derm : no rash    MEDICATIONS  (STANDING):  atorvastatin 10 milliGRAM(s) Oral at bedtime  dextrose 40% Gel 15 Gram(s) Oral once  dextrose 5%. 1000 milliLiter(s) (50 mL/Hr) IV Continuous <Continuous>  dextrose 5%. 1000 milliLiter(s) (100 mL/Hr) IV Continuous <Continuous>  dextrose 50% Injectable 25 Gram(s) IV Push once  dextrose 50% Injectable 12.5 Gram(s) IV Push once  dextrose 50% Injectable 25 Gram(s) IV Push once  glucagon  Injectable 1 milliGRAM(s) IntraMuscular once  insulin lispro (ADMELOG) corrective regimen sliding scale   SubCutaneous three times a day before meals  insulin lispro (ADMELOG) corrective regimen sliding scale   SubCutaneous at bedtime  losartan 50 milliGRAM(s) Oral daily  metoprolol tartrate 25 milliGRAM(s) Oral two times a day  piperacillin/tazobactam IVPB.. 3.375 Gram(s) IV Intermittent every 8 hours    MEDICATIONS  (PRN):  acetaminophen   Tablet .. 650 milliGRAM(s) Oral every 6 hours PRN Mild Pain (1 - 3)  oxycodone    5 mG/acetaminophen 325 mG 1 Tablet(s) Oral every 4 hours PRN Moderate Pain (4 - 6)      Vital Signs Last 24 Hrs  T(C): 36.9 (12 Jan 2021 05:49), Max: 37.1 (11 Jan 2021 23:58)  T(F): 98.5 (12 Jan 2021 05:49), Max: 98.8 (11 Jan 2021 23:58)  HR: 98 (12 Jan 2021 05:49) (85 - 112)  BP: 156/92 (12 Jan 2021 05:49) (139/81 - 226/118)  BP(mean): 134 (11 Jan 2021 22:00) (105 - 134)  RR: 20 (12 Jan 2021 05:49) (14 - 20)  SpO2: 98% (12 Jan 2021 05:49) (98% - 100%)  CAPILLARY BLOOD GLUCOSE      POCT Blood Glucose.: 99 mg/dL (11 Jan 2021 21:10)  POCT Blood Glucose.: 80 mg/dL (11 Jan 2021 17:10)    I&O's Summary    11 Jan 2021 07:01  -  12 Jan 2021 07:00  --------------------------------------------------------  IN: 250 mL / OUT: 800 mL / NET: -550 mL        PHYSICAL EXAM:  HEAD:  Atraumatic, Normocephalic  NECK: Supple, No   JVD  CHEST/LUNG:   no     rales,     no,    rhonchi  HEART: Regular rate and rhythm;         murmur  ABDOMEN: Soft, Nontender, ;   EXTREMITIES:s/p surg  NEUROLOGY:  alert    LABS:                        16.1   10.91 )-----------( 329      ( 12 Jan 2021 06:23 )             47.0     01-12    137  |  99  |  15  ----------------------------<  94  3.7   |  24  |  1.02    Ca    9.3      12 Jan 2021 06:23    TPro  7.5  /  Alb  4.1  /  TBili  0.7  /  DBili  x   /  AST  8<L>  /  ALT  9<L>  /  AlkPhos  52  01-12    PT/INR - ( 11 Jan 2021 11:07 )   PT: 11.6 sec;   INR: 0.97 ratio         PTT - ( 11 Jan 2021 11:07 )  PTT:32.7 sec                      Culture - Tissue with Gram Stain (collected 01-12-21 @ 01:44)  Source: .Tissue Other  Gram Stain (01-12-21 @ 07:13):    No polymorphonuclear cells seen per low power field    No organisms seen per oil power field    Culture - Tissue with Gram Stain (collected 01-12-21 @ 01:43)  Source: .Tissue Other  Gram Stain (01-12-21 @ 07:11):    No polymorphonuclear cells seen per low power field    No organisms seen per oil power field        Consultant(s) Notes Reviewed:      Care Discussed with Consultants/Other Providers:    
Patient is a 40y old  Male who presents with a chief complaint of PVD (12 Jan 2021 08:17)       INTERVAL HPI/OVERNIGHT EVENTS:  Patient seen and evaluated at bedside.  Pt is resting comfortable in NAD. Denies N/V/F/C.    Allergies    No Known Allergies    Intolerances        Vital Signs Last 24 Hrs  T(C): 36.9 (12 Jan 2021 09:24), Max: 37.1 (11 Jan 2021 23:58)  T(F): 98.4 (12 Jan 2021 09:24), Max: 98.8 (11 Jan 2021 23:58)  HR: 96 (12 Jan 2021 09:24) (85 - 112)  BP: 150/88 (12 Jan 2021 09:24) (139/81 - 226/118)  BP(mean): 134 (11 Jan 2021 22:00) (105 - 134)  RR: 20 (12 Jan 2021 09:24) (14 - 20)  SpO2: 99% (12 Jan 2021 09:24) (98% - 100%)    LABS:                        16.1   10.91 )-----------( 329      ( 12 Jan 2021 06:23 )             47.0     01-12    137  |  99  |  15  ----------------------------<  94  3.7   |  24  |  1.02    Ca    9.3      12 Jan 2021 06:23    TPro  7.5  /  Alb  4.1  /  TBili  0.7  /  DBili  x   /  AST  8<L>  /  ALT  9<L>  /  AlkPhos  52  01-12    PT/INR - ( 11 Jan 2021 11:07 )   PT: 11.6 sec;   INR: 0.97 ratio         PTT - ( 11 Jan 2021 11:07 )  PTT:32.7 sec    CAPILLARY BLOOD GLUCOSE      POCT Blood Glucose.: 93 mg/dL (12 Jan 2021 08:21)  POCT Blood Glucose.: 99 mg/dL (11 Jan 2021 21:10)  POCT Blood Glucose.: 80 mg/dL (11 Jan 2021 17:10)      Lower Extremity Physical Exam:  Vasular: DP/PT 2/4, B/L, CFT intact B/L, Temperature gradient warm to cool, B/L.   Neuro: Epicritic sensation diminished to the level of midfoot, B/L.  Musculoskeletal/Ortho: unremarkable   Derm:  Wound #1:  Location: R foot sub metatarsal 2  Size: 1.5 cm x 1.0 cm   Depth: to capsule  Wound bed: granular  Drainage: none  Odor: none  Periwound: macerated  Etiology: neuropathy     s/p R foot 2nd metatarsal head resection (DOS 1/11/21)  - sutures intact w/ no dehiscence, no hematoma, no drainage, stable w/ no acute signs of infection

## 2021-01-12 NOTE — DISCHARGE NOTE NURSING/CASE MANAGEMENT/SOCIAL WORK - NSDPACMPNYOTHER_GEN_ALL_CORE
Indication: Abdominal pain

 

Technique: Continuous helical transaxial imaging of the abdomen and pelvis was

obtained from the lung bases to the pubic symphysis during intravenous contrast

administration. Coronal 2-D reformats were also obtained. Study obtained in a Siemens

sensation 64 slice CT.  Automatic Exposure Control was utilized.

 

Total Dose length Product (DLP):  1466.67 mGycm

 

CT Dose Index Volume (CTDIvol):   26.2 mGy

 

Comparison: None

 

Findings: The appendix is normal. No evidence of bowel obstruction, free fluid or

free air. There is a cyst 3.7 cm involving the right kidney. The gallbladder is

unremarkable. Small hiatal hernia is present. There is minimal right basal

atelectasis. There may be mild thickening of the wall the urinary bladder but the

bladder is mostly nondistended.

 

IMPRESSION:

 

No acute findings. Normal appendix.

 

Right renal cyst.

 

Query cystitis with mild thickening of the wall the urinary bladder. Correlate

clinically.

 

[facility] has communicated the preliminary results to the Emergency

Department.  Their findings are largely concordant with this report.

 

 

 

The CT scanner at [facility] is accredited by the American College of

Radiology and the scans are performed using dose optimization techniques as

appropriate to a performed exam including Automatic Exposure control.
Wife

## 2021-01-12 NOTE — PHYSICAL THERAPY INITIAL EVALUATION ADULT - PERTINENT HX OF CURRENT PROBLEM, REHAB EVAL
41 y/o M with pmhx of DM2 and HTN. Pt present for surgery for an ulcer at the distal tip of his R 2nd metatarsal bone which is concerning for osteomyelitis. Pt was on outpatient cefadroxil. Pt says this ulcer has been present for at least 5 years.

## 2021-01-12 NOTE — PATIENT PROFILE ADULT - STATED REASON FOR ADMISSION
I came in with an ulcer and in order for the ulcer to heal I had to have my metatarsal toe removed from my right foot.

## 2021-01-13 LAB
SARS-COV-2 IGG SERPL QL IA: NEGATIVE — SIGNIFICANT CHANGE UP
SARS-COV-2 IGM SERPL IA-ACNC: <0.1 INDEX — SIGNIFICANT CHANGE UP

## 2021-01-17 LAB
CULTURE RESULTS: SIGNIFICANT CHANGE UP
CULTURE RESULTS: SIGNIFICANT CHANGE UP
SPECIMEN SOURCE: SIGNIFICANT CHANGE UP
SPECIMEN SOURCE: SIGNIFICANT CHANGE UP

## 2021-01-21 LAB — SURGICAL PATHOLOGY STUDY: SIGNIFICANT CHANGE UP

## 2021-04-09 NOTE — ED ADULT NURSE NOTE - NSFALLRSKINDICATORS_ED_ALL_ED
Plastic Surgery Attending    Patient seen and examined.    Doing well.  Transferred to [facility].  Ambulating.  Sarah diet. + flatus, no BM yet.    Temp:  [97 °F (36.1 °C)-97.7 °F (36.5 °C)] 97.5 °F (36.4 °C)  Heart Rate:  [] 99  Resp:  [9-21] 17  BP: (111-140)/(65-90) 117/83    I/O last 3 completed shifts:  In: -   Out: 1985 [Urine:1400; Drains:585]    Drains:  R breast 1- 190ml dilute ssg/24hr  R breast 2- 140ml dilute ssg/24hr  R abd- 80ml dilute ssg/24hr  L abd- 90ml dilute ssg/24hr  L breast 1- 55ml dilute ssg/24hr  L breast 2- 30ml dilute ssg/24hr    Exam:  General: NAD  BL breasts with edema decreasing, no collections  BL inferior mastectomy skin flaps with ecchymosis, ruptured bullae  BL skin paddles warm  BL breast with strong a/v doppler on skin paddle  L breast Vioptix 70%  Incisions c/d/i  Umbilicus viable  Abd incision c/d/i w prevena in place, no collections  BL LE trace edema     Medications:   • bacitracin   Topical BID   • docusate sodium-sennosides  1 tablet Oral BID   • gabapentin  200 mg Oral 3 times per day   • lidocaine  1 patch Transdermal Daily   • polyethylene glycol  17 g Oral Daily   • silver sulfADIAZINE   Topical Daily   • ceFAZolin  2,000 mg Intravenous 3 times per day   • enoxaparin  40 mg Subcutaneous Daily   • Potassium Standard Replacement Protocol   Does not apply See Admin Instructions   • Magnesium Standard Replacement Protocol   Does not apply See Admin Instructions   • Phosphorus Standard Replacement Protocol   Does not apply See Admin Instructions       Labs:  WBC/Hgb/Hct/Plts:  6.2/7.4/23.7/211 (04/09 0432)        49F S/p BL mastectomy, SLNBx, BL immediate free-TRAM, POD4- doing well.    - vioptix and implantable doppler removed this am  - prevena changed to home machine  - drain teaching  - cont ancef/keflex for prophy with mesh in abd wall reconstruction x 7 days  - cont prevena for abd incision  - flap checks Q4hr until dc for color, temp, turgor, BL doppler  - cont  silvadene, xeroform to inferior mastectomy skin flaps with bullae  - bacitracin to areas of superficial skin breakdown  - dc planning for today  - f/u next week       no

## 2021-09-08 NOTE — PRE-OP CHECKLIST - NOTHING BY MOUTH SINCE
Patient in room ORTHO 4024. I have received report from JAZMIN Kam   and had the 
opportunity to ask questions and assume patient care. 11-Jan-2021 00:00

## 2024-08-06 ENCOUNTER — INPATIENT (INPATIENT)
Facility: HOSPITAL | Age: 44
LOS: 2 days | Discharge: HOME HEALTH | DRG: 603 | End: 2024-08-09
Attending: INTERNAL MEDICINE | Admitting: INTERNAL MEDICINE
Payer: COMMERCIAL

## 2024-08-06 ENCOUNTER — TRANSCRIPTION ENCOUNTER (OUTPATIENT)
Age: 44
End: 2024-08-06

## 2024-08-06 VITALS
SYSTOLIC BLOOD PRESSURE: 216 MMHG | TEMPERATURE: 98 F | HEART RATE: 97 BPM | WEIGHT: 270.07 LBS | DIASTOLIC BLOOD PRESSURE: 133 MMHG | OXYGEN SATURATION: 99 % | HEIGHT: 73 IN | RESPIRATION RATE: 20 BRPM

## 2024-08-06 DIAGNOSIS — L03.115 CELLULITIS OF RIGHT LOWER LIMB: ICD-10-CM

## 2024-08-06 DIAGNOSIS — E11.40 TYPE 2 DIABETES MELLITUS WITH DIABETIC NEUROPATHY, UNSPECIFIED: ICD-10-CM

## 2024-08-06 DIAGNOSIS — E11.628 TYPE 2 DIABETES MELLITUS WITH OTHER SKIN COMPLICATIONS: ICD-10-CM

## 2024-08-06 DIAGNOSIS — E78.5 HYPERLIPIDEMIA, UNSPECIFIED: ICD-10-CM

## 2024-08-06 DIAGNOSIS — R63.8 OTHER SYMPTOMS AND SIGNS CONCERNING FOOD AND FLUID INTAKE: ICD-10-CM

## 2024-08-06 DIAGNOSIS — E11.65 TYPE 2 DIABETES MELLITUS WITH HYPERGLYCEMIA: ICD-10-CM

## 2024-08-06 DIAGNOSIS — I10 ESSENTIAL (PRIMARY) HYPERTENSION: ICD-10-CM

## 2024-08-06 LAB
ALBUMIN SERPL ELPH-MCNC: 3.7 G/DL — SIGNIFICANT CHANGE UP (ref 3.3–5)
ALP SERPL-CCNC: 85 U/L — SIGNIFICANT CHANGE UP (ref 40–120)
ALT FLD-CCNC: 10 U/L — SIGNIFICANT CHANGE UP (ref 10–45)
ANION GAP SERPL CALC-SCNC: 13 MMOL/L — SIGNIFICANT CHANGE UP (ref 5–17)
APPEARANCE UR: CLEAR — SIGNIFICANT CHANGE UP
AST SERPL-CCNC: 12 U/L — SIGNIFICANT CHANGE UP (ref 10–40)
BACTERIA # UR AUTO: NEGATIVE /HPF — SIGNIFICANT CHANGE UP
BASE EXCESS BLDV CALC-SCNC: 8 MMOL/L — HIGH (ref -2–3)
BASOPHILS # BLD AUTO: 0.05 K/UL — SIGNIFICANT CHANGE UP (ref 0–0.2)
BASOPHILS NFR BLD AUTO: 0.3 % — SIGNIFICANT CHANGE UP (ref 0–2)
BILIRUB SERPL-MCNC: 0.8 MG/DL — SIGNIFICANT CHANGE UP (ref 0.2–1.2)
BILIRUB UR-MCNC: NEGATIVE — SIGNIFICANT CHANGE UP
BUN SERPL-MCNC: 9 MG/DL — SIGNIFICANT CHANGE UP (ref 7–23)
CA-I SERPL-SCNC: 1.14 MMOL/L — LOW (ref 1.15–1.33)
CALCIUM SERPL-MCNC: 9.4 MG/DL — SIGNIFICANT CHANGE UP (ref 8.4–10.5)
CAST: 2 /LPF — SIGNIFICANT CHANGE UP (ref 0–4)
CHLORIDE BLDV-SCNC: 98 MMOL/L — SIGNIFICANT CHANGE UP (ref 96–108)
CHLORIDE SERPL-SCNC: 95 MMOL/L — LOW (ref 96–108)
CO2 BLDV-SCNC: 37 MMOL/L — HIGH (ref 22–26)
CO2 SERPL-SCNC: 28 MMOL/L — SIGNIFICANT CHANGE UP (ref 22–31)
COLOR SPEC: YELLOW — SIGNIFICANT CHANGE UP
CREAT SERPL-MCNC: 0.82 MG/DL — SIGNIFICANT CHANGE UP (ref 0.5–1.3)
CRP SERPL-MCNC: 182 MG/L — HIGH (ref 0–4)
DIFF PNL FLD: ABNORMAL
EGFR: 111 ML/MIN/1.73M2 — SIGNIFICANT CHANGE UP
EOSINOPHIL # BLD AUTO: 0.03 K/UL — SIGNIFICANT CHANGE UP (ref 0–0.5)
EOSINOPHIL NFR BLD AUTO: 0.2 % — SIGNIFICANT CHANGE UP (ref 0–6)
ERYTHROCYTE [SEDIMENTATION RATE] IN BLOOD: 84 MM/HR — HIGH (ref 0–15)
GAS PNL BLDV: 133 MMOL/L — LOW (ref 136–145)
GAS PNL BLDV: SIGNIFICANT CHANGE UP
GAS PNL BLDV: SIGNIFICANT CHANGE UP
GLUCOSE BLDC GLUCOMTR-MCNC: 250 MG/DL — HIGH (ref 70–99)
GLUCOSE BLDC GLUCOMTR-MCNC: 381 MG/DL — HIGH (ref 70–99)
GLUCOSE BLDV-MCNC: 275 MG/DL — HIGH (ref 70–99)
GLUCOSE SERPL-MCNC: 268 MG/DL — HIGH (ref 70–99)
GLUCOSE UR QL: 500 MG/DL
HCO3 BLDV-SCNC: 35 MMOL/L — HIGH (ref 22–29)
HCT VFR BLD CALC: 41.2 % — SIGNIFICANT CHANGE UP (ref 39–50)
HCT VFR BLDA CALC: 43 % — SIGNIFICANT CHANGE UP (ref 39–51)
HGB BLD CALC-MCNC: 14.2 G/DL — SIGNIFICANT CHANGE UP (ref 12.6–17.4)
HGB BLD-MCNC: 14.4 G/DL — SIGNIFICANT CHANGE UP (ref 13–17)
IMM GRANULOCYTES NFR BLD AUTO: 0.3 % — SIGNIFICANT CHANGE UP (ref 0–0.9)
KETONES UR-MCNC: 15 MG/DL
LACTATE BLDV-MCNC: 2.1 MMOL/L — HIGH (ref 0.5–2)
LACTATE SERPL-SCNC: 1.7 MMOL/L — SIGNIFICANT CHANGE UP (ref 0.5–2)
LEUKOCYTE ESTERASE UR-ACNC: NEGATIVE — SIGNIFICANT CHANGE UP
LYMPHOCYTES # BLD AUTO: 1.61 K/UL — SIGNIFICANT CHANGE UP (ref 1–3.3)
LYMPHOCYTES # BLD AUTO: 10.7 % — LOW (ref 13–44)
MCHC RBC-ENTMCNC: 31.2 PG — SIGNIFICANT CHANGE UP (ref 27–34)
MCHC RBC-ENTMCNC: 35 GM/DL — SIGNIFICANT CHANGE UP (ref 32–36)
MCV RBC AUTO: 89.2 FL — SIGNIFICANT CHANGE UP (ref 80–100)
MONOCYTES # BLD AUTO: 1.2 K/UL — HIGH (ref 0–0.9)
MONOCYTES NFR BLD AUTO: 8 % — SIGNIFICANT CHANGE UP (ref 2–14)
NEUTROPHILS # BLD AUTO: 12.16 K/UL — HIGH (ref 1.8–7.4)
NEUTROPHILS NFR BLD AUTO: 80.5 % — HIGH (ref 43–77)
NITRITE UR-MCNC: NEGATIVE — SIGNIFICANT CHANGE UP
NRBC # BLD: 0 /100 WBCS — SIGNIFICANT CHANGE UP (ref 0–0)
PCO2 BLDV: 58 MMHG — HIGH (ref 42–55)
PH BLDV: 7.39 — SIGNIFICANT CHANGE UP (ref 7.32–7.43)
PH UR: 6.5 — SIGNIFICANT CHANGE UP (ref 5–8)
PLATELET # BLD AUTO: 246 K/UL — SIGNIFICANT CHANGE UP (ref 150–400)
PO2 BLDV: 26 MMHG — SIGNIFICANT CHANGE UP (ref 25–45)
POTASSIUM BLDV-SCNC: 3.9 MMOL/L — SIGNIFICANT CHANGE UP (ref 3.5–5.1)
POTASSIUM SERPL-MCNC: 3.8 MMOL/L — SIGNIFICANT CHANGE UP (ref 3.5–5.3)
POTASSIUM SERPL-SCNC: 3.8 MMOL/L — SIGNIFICANT CHANGE UP (ref 3.5–5.3)
PROT SERPL-MCNC: 8 G/DL — SIGNIFICANT CHANGE UP (ref 6–8.3)
PROT UR-MCNC: 100 MG/DL
RBC # BLD: 4.62 M/UL — SIGNIFICANT CHANGE UP (ref 4.2–5.8)
RBC # FLD: 11.9 % — SIGNIFICANT CHANGE UP (ref 10.3–14.5)
RBC CASTS # UR COMP ASSIST: 6 /HPF — HIGH (ref 0–4)
REVIEW: SIGNIFICANT CHANGE UP
SAO2 % BLDV: 43.6 % — LOW (ref 67–88)
SODIUM SERPL-SCNC: 136 MMOL/L — SIGNIFICANT CHANGE UP (ref 135–145)
SP GR SPEC: 1.01 — SIGNIFICANT CHANGE UP (ref 1–1.03)
SQUAMOUS # UR AUTO: 1 /HPF — SIGNIFICANT CHANGE UP (ref 0–5)
UROBILINOGEN FLD QL: 0.2 MG/DL — SIGNIFICANT CHANGE UP (ref 0.2–1)
WBC # BLD: 15.09 K/UL — HIGH (ref 3.8–10.5)
WBC # FLD AUTO: 15.09 K/UL — HIGH (ref 3.8–10.5)
WBC UR QL: 0 /HPF — SIGNIFICANT CHANGE UP (ref 0–5)

## 2024-08-06 PROCEDURE — 73630 X-RAY EXAM OF FOOT: CPT | Mod: 26,RT

## 2024-08-06 PROCEDURE — 99222 1ST HOSP IP/OBS MODERATE 55: CPT

## 2024-08-06 PROCEDURE — 99285 EMERGENCY DEPT VISIT HI MDM: CPT

## 2024-08-06 RX ORDER — CLOSTRIDIUM TETANI TOXOID ANTIGEN (FORMALDEHYDE INACTIVATED), CORYNEBACTERIUM DIPHTHERIAE TOXOID ANTIGEN (FORMALDEHYDE INACTIVATED), BORDETELLA PERTUSSIS TOXOID ANTIGEN (GLUTARALDEHYDE INACTIVATED), BORDETELLA PERTUSSIS FILAMENTOUS HEMAGGLUTININ ANTIGEN (FORMALDEHYDE INACTIVATED), BORDETELLA PERTUSSIS PERTACTIN ANTIGEN, AND BORDETELLA PERTUSSIS FIMBRIAE 2/3 ANTIGEN 5; 2; 2.5; 5; 3; 5 [LF]/.5ML; [LF]/.5ML; UG/.5ML; UG/.5ML; UG/.5ML; UG/.5ML
0.5 INJECTION, SUSPENSION INTRAMUSCULAR ONCE
Refills: 0 | Status: COMPLETED | OUTPATIENT
Start: 2024-08-06 | End: 2024-08-06

## 2024-08-06 RX ORDER — ENOXAPARIN SODIUM 120 MG/.8ML
40 INJECTION SUBCUTANEOUS EVERY 24 HOURS
Refills: 0 | Status: DISCONTINUED | OUTPATIENT
Start: 2024-08-06 | End: 2024-08-09

## 2024-08-06 RX ORDER — PIPERACILLIN SODIUM, TAZOBACTAM SODIUM 3; .375 G/15ML; G/15ML
3.38 INJECTION, POWDER, LYOPHILIZED, FOR SOLUTION INTRAVENOUS ONCE
Refills: 0 | Status: COMPLETED | OUTPATIENT
Start: 2024-08-06 | End: 2024-08-06

## 2024-08-06 RX ORDER — INSULIN GLARGINE-YFGN 100 [IU]/ML
5 INJECTION, SOLUTION SUBCUTANEOUS AT BEDTIME
Refills: 0 | Status: DISCONTINUED | OUTPATIENT
Start: 2024-08-06 | End: 2024-08-06

## 2024-08-06 RX ORDER — PIPERACILLIN SODIUM, TAZOBACTAM SODIUM 3; .375 G/15ML; G/15ML
3.38 INJECTION, POWDER, LYOPHILIZED, FOR SOLUTION INTRAVENOUS EVERY 8 HOURS
Refills: 0 | Status: DISCONTINUED | OUTPATIENT
Start: 2024-08-06 | End: 2024-08-09

## 2024-08-06 RX ORDER — INSULIN LISPRO 100/ML
VIAL (ML) SUBCUTANEOUS AT BEDTIME
Refills: 0 | Status: DISCONTINUED | OUTPATIENT
Start: 2024-08-06 | End: 2024-08-09

## 2024-08-06 RX ORDER — VALSARTAN 40 MG/1
160 TABLET, FILM COATED ORAL DAILY
Refills: 0 | Status: DISCONTINUED | OUTPATIENT
Start: 2024-08-06 | End: 2024-08-09

## 2024-08-06 RX ORDER — DEXTROSE 4 G
25 TABLET,CHEWABLE ORAL ONCE
Refills: 0 | Status: DISCONTINUED | OUTPATIENT
Start: 2024-08-06 | End: 2024-08-09

## 2024-08-06 RX ORDER — DEXTROSE 4 G
15 TABLET,CHEWABLE ORAL ONCE
Refills: 0 | Status: DISCONTINUED | OUTPATIENT
Start: 2024-08-06 | End: 2024-08-09

## 2024-08-06 RX ORDER — INSULIN GLARGINE-YFGN 100 [IU]/ML
10 INJECTION, SOLUTION SUBCUTANEOUS AT BEDTIME
Refills: 0 | Status: DISCONTINUED | OUTPATIENT
Start: 2024-08-06 | End: 2024-08-07

## 2024-08-06 RX ORDER — DEXTROSE MONOHYDRATE, SODIUM CHLORIDE, SODIUM LACTATE, CALCIUM CHLORIDE, MAGNESIUM CHLORIDE 1.5; 538; 448; 18.4; 5.08 G/100ML; MG/100ML; MG/100ML; MG/100ML; MG/100ML
1000 SOLUTION INTRAPERITONEAL
Refills: 0 | Status: DISCONTINUED | OUTPATIENT
Start: 2024-08-06 | End: 2024-08-09

## 2024-08-06 RX ORDER — VANCOMYCIN HYDROCHLORIDE 5 G/100ML
1000 INJECTION, POWDER, LYOPHILIZED, FOR SOLUTION INTRAVENOUS ONCE
Refills: 0 | Status: COMPLETED | OUTPATIENT
Start: 2024-08-06 | End: 2024-08-06

## 2024-08-06 RX ORDER — GLUCAGON INJECTION, SOLUTION 0.5 MG/.1ML
1 INJECTION, SOLUTION SUBCUTANEOUS ONCE
Refills: 0 | Status: DISCONTINUED | OUTPATIENT
Start: 2024-08-06 | End: 2024-08-09

## 2024-08-06 RX ORDER — INSULIN LISPRO 100/ML
4 VIAL (ML) SUBCUTANEOUS
Refills: 0 | Status: DISCONTINUED | OUTPATIENT
Start: 2024-08-06 | End: 2024-08-07

## 2024-08-06 RX ORDER — BACTERIOSTATIC SODIUM CHLORIDE 0.9 %
1000 VIAL (ML) INJECTION ONCE
Refills: 0 | Status: COMPLETED | OUTPATIENT
Start: 2024-08-06 | End: 2024-08-06

## 2024-08-06 RX ORDER — MUPIROCIN CALCIUM 20 MG/G
1 CREAM TOPICAL
Refills: 0 | Status: DISCONTINUED | OUTPATIENT
Start: 2024-08-06 | End: 2024-08-09

## 2024-08-06 RX ORDER — INSULIN LISPRO 100/ML
VIAL (ML) SUBCUTANEOUS
Refills: 0 | Status: DISCONTINUED | OUTPATIENT
Start: 2024-08-06 | End: 2024-08-09

## 2024-08-06 RX ORDER — DEXTROSE 4 G
12.5 TABLET,CHEWABLE ORAL ONCE
Refills: 0 | Status: DISCONTINUED | OUTPATIENT
Start: 2024-08-06 | End: 2024-08-09

## 2024-08-06 RX ADMIN — VANCOMYCIN HYDROCHLORIDE 1000 MILLIGRAM(S): 5 INJECTION, POWDER, LYOPHILIZED, FOR SOLUTION INTRAVENOUS at 12:30

## 2024-08-06 RX ADMIN — VANCOMYCIN HYDROCHLORIDE 250 MILLIGRAM(S): 5 INJECTION, POWDER, LYOPHILIZED, FOR SOLUTION INTRAVENOUS at 11:30

## 2024-08-06 RX ADMIN — Medication 5: at 17:37

## 2024-08-06 RX ADMIN — VALSARTAN 160 MILLIGRAM(S): 40 TABLET, FILM COATED ORAL at 21:41

## 2024-08-06 RX ADMIN — PIPERACILLIN SODIUM, TAZOBACTAM SODIUM 25 GRAM(S): 3; .375 INJECTION, POWDER, LYOPHILIZED, FOR SOLUTION INTRAVENOUS at 22:09

## 2024-08-06 RX ADMIN — Medication 1000 MILLILITER(S): at 12:14

## 2024-08-06 RX ADMIN — INSULIN GLARGINE-YFGN 10 UNIT(S): 100 INJECTION, SOLUTION SUBCUTANEOUS at 22:43

## 2024-08-06 RX ADMIN — Medication 1000 MILLILITER(S): at 13:35

## 2024-08-06 RX ADMIN — CLOSTRIDIUM TETANI TOXOID ANTIGEN (FORMALDEHYDE INACTIVATED), CORYNEBACTERIUM DIPHTHERIAE TOXOID ANTIGEN (FORMALDEHYDE INACTIVATED), BORDETELLA PERTUSSIS TOXOID ANTIGEN (GLUTARALDEHYDE INACTIVATED), BORDETELLA PERTUSSIS FILAMENTOUS HEMAGGLUTININ ANTIGEN (FORMALDEHYDE INACTIVATED), BORDETELLA PERTUSSIS PERTACTIN ANTIGEN, AND BORDETELLA PERTUSSIS FIMBRIAE 2/3 ANTIGEN 0.5 MILLILITER(S): 5; 2; 2.5; 5; 3; 5 INJECTION, SUSPENSION INTRAMUSCULAR at 10:46

## 2024-08-06 RX ADMIN — PIPERACILLIN SODIUM, TAZOBACTAM SODIUM 200 GRAM(S): 3; .375 INJECTION, POWDER, LYOPHILIZED, FOR SOLUTION INTRAVENOUS at 10:48

## 2024-08-06 RX ADMIN — PIPERACILLIN SODIUM, TAZOBACTAM SODIUM 3.38 GRAM(S): 3; .375 INJECTION, POWDER, LYOPHILIZED, FOR SOLUTION INTRAVENOUS at 11:25

## 2024-08-06 RX ADMIN — PIPERACILLIN SODIUM, TAZOBACTAM SODIUM 25 GRAM(S): 3; .375 INJECTION, POWDER, LYOPHILIZED, FOR SOLUTION INTRAVENOUS at 17:21

## 2024-08-06 NOTE — CONSULT NOTE ADULT - SUBJECTIVE AND OBJECTIVE BOX
HPI:  43 y/o M PMH non insulin dependent type 2 diabetes, peripheral diabetic neuropathy presents w/ R foot injury after stepping on a screw at a junkyard 4 days ago. The patient went to see his outpatient podiatry attending today who sent him to the Emergency Department for right  foot wound draining pus and probing to the bone to r/o osteomyelitis and for IV antibiotics. Of note the patient's last HbA1C was ~ 7 range in March 2024 but then he was taken off all DM meds and HTN meds for unclear reasons and now his latest outpatient HbA1C was 12. The patient expresses unwillingness to start insulin. He was on Trulicity and Jardiance in March.  (06 Aug 2024 12:57)      Endocrinology HPI    Diabetes Mellitus Type 2  Diagnosis: 2-3 years ago   Symptoms: Denies any polyuria, polydipsia   Patient states that he was initially following with a different endocrinologist who had him on Trulicity 1.5mg weekly and MFM 1000mg BID. Since then, patient states that he lost significant amount of weight and changed his diet around. He then changed his endocrinologist in March of this year and he states that the new endo stopped trulicity and decreased dose of MFM to 500mg BID.  Patient states that he checks his sugars at home twice a day and sugars are usually in the 80s-low 100s   Last HgbA1c: states it was 6.5% in March 2024, pending from this admisson  Outpatient regimen: Metformin 500mg BID   Compliance: good   FH: Grandmother with type 2 DM  Tobacco, etoh, drug use: Denies  Diet: Tries to eat a healthy, balanced diabetic diet.   Exercise: Walks 12 miles a day due to work   History of CAD/MI/Stroke: Denies     Review of Systems:  Constitutional: No fever, good appetite/po intake  Eyes: No blurry vision, diplopia  Neuro: No tremors  HEENT: No pain  Cardiovascular: No chest pain, palpitations  Respiratory: No SOB, no cough  GI: No nausea, vomiting,   : No dysuria, hematuria  Skin: no rash  Psych: no depression  Endocrine: no polyuria, polydipsia  Hem/lymph: no swelling  Osteoporosis: no fractures    ALL OTHER SYSTEMS REVIEWED AND NEGATIVE    PHYSICAL EXAM:  VITALS: T(C): 37.2 (08-06-24 @ 10:35)  T(F): 99 (08-06-24 @ 10:35), Max: 99 (08-06-24 @ 10:35)  HR: 90 (08-06-24 @ 13:59) (90 - 97)  BP: 210/125 (08-06-24 @ 13:59) (186/115 - 216/133)  RR:  (18 - 20)  SpO2:  (98% - 99%)  Wt(kg): --  GENERAL: NAD, well-groomed, well-developed  EYES: No proptosis, extraocular movements intact,  no lid lag, anicteric  HEENT:  Atraumatic, Normocephalic, moist mucous membranes  THYROID: Normal size, no palpable nodules, no thyromegaly  RESPIRATORY: Clear to auscultation bilaterally; No rales, rhonchi, wheezing, or rubs  CARDIOVASCULAR: Regular rate and rhythm; No murmurs; no peripheral edema  GI: Soft, nontender, non distended, normal bowel sounds  SKIN: Dry, intact, No rashes or lesions  EXTREMITIES: No foot ulcers, distal pedal pulses intact bilaterally  NEURO: sensation intact, no tremors  PSYCH: reactive affect, euthymic mood  CUSHING'S SIGNS: no striae or visible bruising                              14.4   15.09 )-----------( 246      ( 06 Aug 2024 11:22 )             41.2       08-06    136  |  95<L>  |  9   ----------------------------<  268<H>  3.8   |  28  |  0.82    eGFR: 111    Ca    9.4      08-06    TPro  8.0  /  Alb  3.7  /  TBili  0.8  /  DBili  x   /  AST  12  /  ALT  10  /  AlkPhos  85  08-06      Thyroid Function Tests:          Radiology:

## 2024-08-06 NOTE — H&P ADULT - NSVTERISKASSESS_GEN_ALL_CORE FT
Medical Assessment Completed on: 06-Aug-2024 13:06
41 y/o Female with HIV presents to ED for pain behind right eye and HA for 2 days.  Covid (+) 3 weeks ago.  Will f/u Labs, CT.  Will re-eval.  Antonia Becker PA-C

## 2024-08-06 NOTE — H&P ADULT - HISTORY OF PRESENT ILLNESS
45 y/o M PMH non insulin dependent type 2 diabetes, peripheral diabetic neuropathy presents w/ R foot injury after stepping on a screw at a junkyard 4 days ago. The patient went to see his outpatient podiatry attending today who sent him to the Emergency Department for right  foot wound draining pus and probing to the bone to r/o osteomyelitis and for IV antibiotics. Of note the patient's last HbA1C was ~ 7 range in March 2024 but then he was taken off all DM meds and HTN meds for unclear reasons and now his latest outpatient HbA1C was 12. The patient expresses unwillingness to start insulin. He was on Trulicity and Jardiance in March.

## 2024-08-06 NOTE — ED ADULT TRIAGE NOTE - CHIEF COMPLAINT QUOTE
R ft pain/swelling, stepped on nail (pierced through shoe) x 4 days ago, sent by DPM for IV abx/MRI/xray/tdap, hx T2DM, hx neuropathy - limited sensation

## 2024-08-06 NOTE — CONSULT NOTE ADULT - ASSESSMENT
A/P: 43 y/o M PMH non insulin dependent type 2 diabetes, peripheral diabetic neuropathy presents w/ R foot injury after stepping on a screw at a junkyard 4 days ago. The patient went to see his outpatient podiatry attending today who sent him to the Emergency Department for right  foot wound draining pus and probing to the bone to r/o osteomyelitis and for IV antibiotics. Endocrinology was consulted for management of diabetes mellitus.    #Type 2 Diabetes Mellitus  - HbA1c pending this admission. States that it was about 6.5% in March 2024. In the chart, Jan 2021, A1c is 6.4%  - States BG at home is in the 80-100s   ; home regimen: MFM 500mg BID (states that prior to March, patient was on Trulicity 1.5mg weekly and MFM 1000mg BID)  - States that he read his inpatient labs on his phone through his kurt and saw that his HgbA1c was 14. When asked to show it, patient had accidentally read the Hgb value, not the HgbA1c value. Unclear patient's most recent A1c- currently pending.   -egfr: 111    Plan:   - Recommend to start patient on 10 units of lantus QHS  - Recommend 4 units of Admelog TIDQAC  - Recommend moderate Admelog correction scale TIDQAC and QHS  - Please check FSG before meals and QHS, or q6h while NPO  - Inpatient glucose goal 100-180   - Please keep patient on a diabetic, carb controlled diet   - hypoglycemia orderset prn  - Discharge planning: Will depend on patient's HgbA1c and inpatient glucose trend. Can consider starting patient on GLP-1 agonist at discharge.     #Hypertension  - BP goal <130/80  - Management as per primary team    #HLD  - Goal LDL <70 in the setting of diabetes  - Please check fasting lipid panel if not checked recently     #Obesity  - Consider initiating GLP-1 agonist at discharge.     Spoke with primary team regarding plan.     Misa Argueta, DO  Attending Physician   Department of Endocrinology, Diabetes and Metabolism     If before 9AM or after 5PM, or on weekends/holidays, please call the Endocrine answering service for assistance ([phone]).  For nonurgent matters, please email [email or web address] for assistance.     Please note that a different provider may be following this patient each day.

## 2024-08-06 NOTE — H&P ADULT - NSHPPHYSICALEXAM_GEN_ALL_CORE
PHYSICAL EXAM: vital signs noted on [place]rise  in no apparent distress  HEENT: ANA EOMI  Neck: Supple, no JVD, no thyromegaly  Lungs: no wheeze, no crackles  CVS: S1 S2 no M/R/G  Abdomen: no tenderness, no organomegaly, BS present  Neuro: AO x 3 no focal weakness, no sensory abnormalities  Psych: appropriate affect  Ext: no cyanosis or clubbing, no edema  right sole erythema and puncture wound with scanty pus below the great toe  no foul smell  Msk: no joint swelling or deformities  Back: no CVA tenderness, no kyphosis/scoliosis

## 2024-08-06 NOTE — H&P ADULT - PROBLEM SELECTOR PLAN 1
with associated sepsis (WBC > 12 and HR > 90 with clear evident source)  ID consultation appreciated   start piperacillin/tazobactam   hold vancomycin per ID for now  swab from pus sent to [facility]  blood cultures testing

## 2024-08-06 NOTE — CONSULT NOTE ADULT - SUBJECTIVE AND OBJECTIVE BOX
Patient is a 44y old  Male who presents with a chief complaint of diabetic foot ulcer (06 Aug 2024 18:53)      HPI:  43 y/o M PMH non insulin dependent type 2 diabetes, peripheral diabetic neuropathy presents w/ R foot injury after stepping on a screw [place] a junkyard 4 days ago. The patient went to see his outpatient podiatry attending today who sent him to the Emergency Department for right  foot wound draining pus and probing to the bone to r/o osteomyelitis and for IV antibiotics. Of note the patient's last HbA1C was ~ 7 range in March 2024 but then he was taken off all DM meds and HTN meds for unclear reasons and now his latest outpatient HbA1C was 12. The patient expresses unwillingness to start insulin. He was on Trulicity and Jardiance in March.  (06 Aug 2024 12:57)      PAST MEDICAL & SURGICAL HISTORY:  Diabetic neuropathy      HTN (hypertension)      HLD (hyperlipidemia)      DM (diabetes mellitus)      No significant past surgical history          MEDICATIONS  (STANDING):  dextrose 5%. 1000 milliLiter(s) (50 mL/Hr) IV Continuous <Continuous>  dextrose 5%. 1000 milliLiter(s) (100 mL/Hr) IV Continuous <Continuous>  dextrose 50% Injectable 25 Gram(s) IV Push once  dextrose 50% Injectable 12.5 Gram(s) IV Push once  dextrose 50% Injectable 25 Gram(s) IV Push once  enoxaparin Injectable 40 milliGRAM(s) SubCutaneous every 24 hours  glucagon  Injectable 1 milliGRAM(s) IntraMuscular once  insulin glargine Injectable (LANTUS) 10 Unit(s) SubCutaneous at bedtime  insulin lispro (ADMELOG) corrective regimen sliding scale   SubCutaneous three times a day before meals  insulin lispro Injectable (ADMELOG) 4 Unit(s) SubCutaneous three times a day before meals  piperacillin/tazobactam IVPB.. 3.375 Gram(s) IV Intermittent every 8 hours  valsartan 160 milliGRAM(s) Oral daily    MEDICATIONS  (PRN):  dextrose Oral Gel 15 Gram(s) Oral once PRN Blood Glucose LESS THAN 70 milliGRAM(s)/deciliter      Allergies    No Known Allergies    Intolerances        VITALS:    Vital Signs Last 24 Hrs  T(C): 37.2 (06 Aug 2024 10:35), Max: 37.2 (06 Aug 2024 10:35)  T(F): 99 (06 Aug 2024 10:35), Max: 99 (06 Aug 2024 10:35)  HR: 90 (06 Aug 2024 13:59) (90 - 97)  BP: 210/125 (06 Aug 2024 13:59) (186/115 - 216/133)  BP(mean): --  RR: 18 (06 Aug 2024 13:59) (18 - 20)  SpO2: 98% (06 Aug 2024 13:59) (98% - 99%)    Parameters below as of 06 Aug 2024 13:59  Patient On (Oxygen Delivery Method): room air        LABS:                          14.4   15.09 )-----------( 246      ( 06 Aug 2024 11:22 )             41.2       08-06    136  |  95<L>  |  9   ----------------------------<  268<H>  3.8   |  28  |  0.82    Ca    9.4      06 Aug 2024 11:22    TPro  8.0  /  Alb  3.7  /  TBili  0.8  /  DBili  x   /  AST  12  /  ALT  10  /  AlkPhos  85  08-06      CAPILLARY BLOOD GLUCOSE      POCT Blood Glucose.: 381 mg/dL (06 Aug 2024 17:24)          LOWER EXTREMITY PHYSICAL EXAM:    Vascular: DP/PT _/4, B/L, CFT <_ seconds B/L, Temperature gradient _, B/L.   Neuro: Epicritic sensation _ to the level of _, B/L.  Musculoskeletal/Ortho:  Skin:       RADIOLOGY & ADDITIONAL STUDIES:      ACC: [id number] EXAM:  XR FOOT COMP MIN 3 VIEWS RT   ORDERED BY:  FELICIA MALLORY     PROCEDURE DATE:  08/06/2024          INTERPRETATION:  CLINICAL INDICATION: Penetrating foot injury. Pain.    TECHNIQUE: 3 views right foot.    COMPARISON: Foot x-rays 1/11/2021.      FINDINGS/  IMPRESSION:  Patient is status post resection of the second metatarsal head. Surgical   margin is smooth without cortical irregularity. There is no osseous   destruction or aggressive periosteal reaction. Productive changes are   seen about the base of the fifth metatarsal. There is retrocalcaneal   enthesophyte formation. Spurring about the dorsal navicular.    No radiopaque foreign body. No fracture. No dislocation. Vascular   calcification is present. Scattered soft tissue calcifications about the   lower leg.    --- End of Report ---            MOBEEN NESSA MD; Attending Radiologist  This document has been electronically signed. Aug  6 2024 10:07AM   Patient is a 44y old  Male who presents with a chief complaint of diabetic foot ulcer (06 Aug 2024 18:53)      HPI:  43 y/o M PMH non insulin dependent type 2 diabetes, peripheral diabetic neuropathy presents w/ R foot injury after stepping on a screw at a junkyard 4 days ago. The patient went to see his outpatient podiatry attending today who sent him to the Emergency Department for right  foot wound draining pus and probing to the bone to r/o osteomyelitis and for IV antibiotics. Of note the patient's last HbA1C was ~ 7 range in March 2024 but then he was taken off all DM meds and HTN meds for unclear reasons and now his latest outpatient HbA1C was 12. The patient expresses unwillingness to start insulin. He was on Trulicity and Jardiance in March.  (06 Aug 2024 12:57)      PAST MEDICAL & SURGICAL HISTORY:  Diabetic neuropathy      HTN (hypertension)      HLD (hyperlipidemia)      DM (diabetes mellitus)      No significant past surgical history          MEDICATIONS  (STANDING):  dextrose 5%. 1000 milliLiter(s) (50 mL/Hr) IV Continuous <Continuous>  dextrose 5%. 1000 milliLiter(s) (100 mL/Hr) IV Continuous <Continuous>  dextrose 50% Injectable 25 Gram(s) IV Push once  dextrose 50% Injectable 12.5 Gram(s) IV Push once  dextrose 50% Injectable 25 Gram(s) IV Push once  enoxaparin Injectable 40 milliGRAM(s) SubCutaneous every 24 hours  glucagon  Injectable 1 milliGRAM(s) IntraMuscular once  insulin glargine Injectable (LANTUS) 10 Unit(s) SubCutaneous at bedtime  insulin lispro (ADMELOG) corrective regimen sliding scale   SubCutaneous three times a day before meals  insulin lispro Injectable (ADMELOG) 4 Unit(s) SubCutaneous three times a day before meals  piperacillin/tazobactam IVPB.. 3.375 Gram(s) IV Intermittent every 8 hours  valsartan 160 milliGRAM(s) Oral daily    MEDICATIONS  (PRN):  dextrose Oral Gel 15 Gram(s) Oral once PRN Blood Glucose LESS THAN 70 milliGRAM(s)/deciliter      Allergies    No Known Allergies    Intolerances        VITALS:    Vital Signs Last 24 Hrs  T(C): 37.2 (06 Aug 2024 10:35), Max: 37.2 (06 Aug 2024 10:35)  T(F): 99 (06 Aug 2024 10:35), Max: 99 (06 Aug 2024 10:35)  HR: 90 (06 Aug 2024 13:59) (90 - 97)  BP: 210/125 (06 Aug 2024 13:59) (186/115 - 216/133)  BP(mean): --  RR: 18 (06 Aug 2024 13:59) (18 - 20)  SpO2: 98% (06 Aug 2024 13:59) (98% - 99%)    Parameters below as of 06 Aug 2024 13:59  Patient On (Oxygen Delivery Method): room air        LABS:                          14.4   15.09 )-----------( 246      ( 06 Aug 2024 11:22 )             41.2       08-06    136  |  95<L>  |  9   ----------------------------<  268<H>  3.8   |  28  |  0.82    Ca    9.4      06 Aug 2024 11:22    TPro  8.0  /  Alb  3.7  /  TBili  0.8  /  DBili  x   /  AST  12  /  ALT  10  /  AlkPhos  85  08-06      CAPILLARY BLOOD GLUCOSE      POCT Blood Glucose.: 381 mg/dL (06 Aug 2024 17:24)          LOWER EXTREMITY PHYSICAL EXAM:    Vascular: DP/PT 1/4, B/L, CFT <3 seconds B/L, Temperature gradient warm to warm, B/L.   Neuro: Epicritic sensation diminished to the level of digits, B/L.  Musculoskeletal/Ortho: unremarkable  Skin: R foot wound sub first metatarsal head wound periosteum with scant purulent drainage, no tracking or tunneling, no soft tissue crepitus or fluctuance, no malodor, sub 2nd metatarsal hyperkeratosis with no underlying wound. L foot no open wounds or signs of infection       RADIOLOGY & ADDITIONAL STUDIES:      ACC: [id number] EXAM:  XR FOOT COMP MIN 3 VIEWS RT   ORDERED BY:  FELICIA MALLORY     PROCEDURE DATE:  08/06/2024          INTERPRETATION:  CLINICAL INDICATION: Penetrating foot injury. Pain.    TECHNIQUE: 3 views right foot.    COMPARISON: Foot x-rays 1/11/2021.      FINDINGS/  IMPRESSION:  Patient is status post resection of the second metatarsal head. Surgical   margin is smooth without cortical irregularity. There is no osseous   destruction or aggressive periosteal reaction. Productive changes are   seen about the base of the fifth metatarsal. There is retrocalcaneal   enthesophyte formation. Spurring about the dorsal navicular.    No radiopaque foreign body. No fracture. No dislocation. Vascular   calcification is present. Scattered soft tissue calcifications about the   lower leg.    --- End of Report ---            ROSEMARY SZYMANSKI MD; Attending Radiologist  This document has been electronically signed. Aug  6 2024 10:07AM

## 2024-08-06 NOTE — CONSULT NOTE ADULT - ASSESSMENT
44 year old with   DM for 20 years  not on insulin.  Hx of metatarsal resection 2 years ago over 4  th toe.  Neuropathy   good pulses   4 days ago stepped on a s crew that felt through his clogs and punctured the area under his first toe  Denies fever chills or pain but has mild cellulitis  over the dorsum of the foot and swelling at puncture site.     At risk risk for Pseudomonas infection after puncture  wound and OM but too early to see on MRI  zosyn   ( can hold vanco)  MRSA nasal screen  would probably hold off for several days prior to doing an MRI to increase the yield ( he will need to be here for IV antibiotics regardless)   Prognosis for the toe is guarded

## 2024-08-06 NOTE — ED PROVIDER NOTE - OBJECTIVE STATEMENT
43 y/o M pmhx non insulin dependent type 2 diabetes presents w/ R foot injury after stepping on a screw [place] a junkyard 4 days ago. pt's tetanus is not up to date. Pt was seen by a podiatrist today who sent him to the ED for tetanus shot, IV antibiotics after identifying the would as infected. Pt has a history of foot injuries and neuropathy.    Pt denies fever, chills, chest pain, SOB

## 2024-08-06 NOTE — CONSULT NOTE ADULT - SUBJECTIVE AND OBJECTIVE BOX
Patient is a 44y old  Male who presents with a chief complaint of   HPI:      PAST MEDICAL & SURGICAL HISTORY:  Diabetes          Social history:    FAMILY HISTORY:    R        Allergic/Immunologic:	No hives or rash   Allergies    No Known Allergies    Intolerances        Antimicrobials:    piperacillin/tazobactam IVPB... 3.375 Gram(s) IV Intermittent once  vancomycin  IVPB. 1000 milliGRAM(s) IV Intermittent once        Vital Signs Last 24 Hrs  T(C): 36.9 (06 Aug 2024 08:53), Max: 36.9 (06 Aug 2024 08:53)  T(F): 98.4 (06 Aug 2024 08:53), Max: 98.4 (06 Aug 2024 08:53)  HR: 97 (06 Aug 2024 08:53) (97 - 97)  BP: 216/133 (06 Aug 2024 08:53) (216/133 - 216/133)  BP(mean): --  RR: 20 (06 Aug 2024 08:53) (20 - 20)  SpO2: 99% (06 Aug 2024 08:53) (99% - 99%)    Parameters below as of 06 Aug 2024 08:53  Patient On (Oxygen Delivery Method): room air           ENMT:No sinus tenderness.No thrush.No pharyngeal exudate or erythema.Fair dental hygiene    Neck:Supple,No LN,no JVD      Respiratory:Good air entry bilaterally,CTA    Cardiovascular:S1 S2 wnl, No murmurs,rub or gallops    Gastrointestinal:Soft BS(+) no tenderness no masses ,No rebound or guarding    Genitourinary:No CVA tendereness     Rectal:    Extremities:N Hallux is red   foot is warm  puncture wd at base of first toe. no drainage     Vascular:peripheral pulses felt                             RECENT CULTURES:      MICROBIOLOGY:          Radiology:      Assessment:        Recommendations and Plan:    Pager [phone]  After 5 pm/weekends or if no response :[phone]

## 2024-08-06 NOTE — ED ADULT NURSE NOTE - OBJECTIVE STATEMENT
1005 44 yr old BM brought to ER by wife for further eval and tx of right foot puncture wound. s/p stepped on a george nail and it went through his shoe into his foot 6 days ago. PMH DM with peripheral neuropathy. Puncture wound noted with swelling at sole of right foot. Was evaluated by Podiatrist in office today and advised to come to Er for further eval and tx. Denies pain, fever, chills or difficulty walking. Pt states his BP is always elevated when he comes to the hospital and he is not on meds for the HTN. 1005 44 yr old BM brought to ER by wife for further eval and tx of right foot puncture wound. s/p stepped on a george nail and it went through his shoe into his foot 6 days ago. PMH DM with peripheral neuropathy. Puncture wound noted with swelling at sole of right foot. Was evaluated by Podiatrist in office today and advised to come to ER for further eval and tx. Denies pain, fever, chills or difficulty walking. Pt states his BP is always elevated when he comes to the hospital and he is not on meds for the HTN. A&Ox4. ambulatory

## 2024-08-06 NOTE — H&P ADULT - ASSESSMENT
45 y/o M PMH non insulin dependent type 2 diabetes, peripheral diabetic neuropathy presents w/ R foot injury after stepping on a screw now clinical presentation consistent with diabetic right foot infection with associated sepsis and uncontrolled HTN

## 2024-08-06 NOTE — H&P ADULT - NSICDXFAMILYHX_GEN_ALL_CORE_FT
FAMILY HISTORY:  Father  Still living? Unknown  Family history of diabetes mellitus (DM), Age at diagnosis: Age Unknown    Mother  Still living? Unknown  FHx: breast cancer, Age at diagnosis: Age Unknown    Grandparent  Still living? Unknown  Family history of diabetes mellitus (DM), Age at diagnosis: Age Unknown

## 2024-08-06 NOTE — H&P ADULT - NSCORESITESY/N_GEN_A_CORE_RD
Care Transition Team Assessment    Readmission Evaluation  Is this a readmission?: Yes - unplanned readmission    In the case of an emergency, please contact Johnny Jauregui at [phone].     This RN Case Manager conducted a chart review to obtain the information used in this assessment. Patient lives with her adult children in a mobile home. Patient uses the [facility] pharmacy on [address]. Prior to current hospitalization, patient was completely independent with ADLS/IADLS. Patient gets a ride to and from her son doctors appointments. Patient is currently on service with [facility] for her home oxygen. Patient receives  and VA benefits. Patient support system includes her sister and adult children. Patient's discharge plan is pending.     Elopement Risk  Legal Hold: No  Ambulatory or Self Mobile in Wheelchair: Yes  Disoriented: No  Psychiatric Symptoms: None  History of Wandering: No  Elopement this Admit: No  Vocalizing Wanting to Leave: No  Displays Behaviors, Body Language Wanting to Leave: No-Not at Risk for Elopement  Elopement Risk: Not at Risk for Elopement    Interdisciplinary Discharge Planning  Primary Care Physician: Baltazar Julio MD  Lives with - Patient's Self Care Capacity: Adult Children  Patient or legal guardian wants to designate a caregiver (see row info): No  Support Systems: Children, Family Member(s)  Housing / Facility: Mobile Home  Mobility Issues: Yes  Durable Medical Equipment: Home Oxygen  DME Provider / Phone: [facility] [phone]     Vision / Hearing Impairment  Vision Impairment : No  Hearing Impairment : No     Domestic Abuse  Have you ever been the victim of abuse or violence?: No  Physical Abuse or Sexual Abuse: No  Verbal Abuse or Emotional Abuse: No  Possible Abuse Reported to: Not Applicable     Discharge Risks or Barriers  Patient risk factors: Vulnerable adult    Anticipated Discharge Information  [address] [place] [place] [address]  Discharge  Contact Phone Number: [phone]         No

## 2024-08-06 NOTE — CONSULT NOTE ADULT - ASSESSMENT
40yo M with Hx HTN, HLD, DM (c/b diabetic neuropathy) who p/w R foot wound after stepping on a screw 4 days ago, admitted for IV Abx. Vascular Surgery consulted for management and evaluation for possible revascularization.     PLAN:   - No acute vascular surgery intervention at this time  - Please obtain TAMI/PVRs     *** INCOMPLETE NOTE PENDING ATTENDING RECS ***     Discussed with vascular surgery fellow, Dr. Maciel, on behalf of Dr. Sreekanth Castro, PGY-3  Vascular Surgery  #[id number]  43yo M with Hx HTN, HLD, DM (c/b diabetic neuropathy) who p/w R foot wound after stepping on a screw 4 days ago, admitted for IV Abx. Vascular Surgery consulted for management and evaluation for possible revascularization.     PLAN:   - No acute vascular surgery intervention at this time  - Please obtain TAMI/PVRs     *** INCOMPLETE NOTE PENDING ATTENDING RECS ***     Discussed with vascular surgery fellow, Dr. Maciel, on behalf of Dr. Sreekanth Castro, PGY-3  Vascular Surgery  #[id number]  43yo M with Hx HTN, HLD, DM (c/b diabetic neuropathy) and prior who p/w R foot wound after stepping on a screw 4 days ago with purulence from wound probing to bone, admitted for IV abx for osteomyelitis. Vascular Surgery consulted for evaluation for possible revascularization.     PLAN:   - No acute vascular surgery intervention at this time  - Please obtain TAMI/PVRs with toe pressures  - Wound care per podiatry  - Abx per ID  - Global care per primary    Discussed with Dr. Stack    Vascular Surgery  #[id number]  43yo M with Hx HTN, HLD, DM (c/b diabetic neuropathy) and prior who p/w R foot wound after stepping on a screw 4 days ago with purulence from wound probing to bone, admitted for IV abx for osteomyelitis and uncontrolled hypertension. Vascular Surgery consulted for evaluation for possible revascularization.     PLAN:   - No acute vascular surgery intervention at this time  - Please obtain TAMI/PVRs with toe pressures  - Wound care per podiatry  - Abx per ID  - Global care per primary    Discussed with Dr. Stack    Vascular Surgery  #[id number]  43yo M with Hx HTN, HLD, DM (c/b diabetic neuropathy) and prior who p/w R foot wound after stepping on a screw 4 days ago with purulence from wound probing to bone, admitted for IV abx for osteomyelitis and uncontrolled hypertension. Vascular Surgery consulted for evaluation for possible revascularization.     PLAN:   - Please obtain TAMI/PVRs with toe pressures  - Wound care per podiatry  - Abx per ID  - Global care per primary    Discussed with Dr. Stack    Vascular Surgery  #[id number]

## 2024-08-06 NOTE — H&P ADULT - NSICDXPASTMEDICALHX_GEN_ALL_CORE_FT
PAST MEDICAL HISTORY:  Diabetic neuropathy     DM (diabetes mellitus)     HLD (hyperlipidemia)     HTN (hypertension)

## 2024-08-06 NOTE — ED PROVIDER NOTE - CLINICAL SUMMARY MEDICAL DECISION MAKING FREE TEXT BOX
45 y/o M pmhx T2DM non insulin dependent presents w/ r foot puncture wound at the base of the 1st metatarsal that occurred after stepping on a screw in a junkyard 4 days ago. Pt is not up to date on tetanus. Pt has a history of foot injuries and ulcers. Pt was seen by podiatrist today who sent pt to the ED. On exam there is a puncture wound on the base of the 1st metatarsal that is swollen, warm, and erythematous. Probable infected wound. Will administer tetanus vaccine, obtain x-rays and administer IV antibiotics, will consult podiatry and admit pt for further management. 43 y/o M pmhx T2DM non insulin dependent presents w/ r foot puncture wound at the base of the 1st metatarsal that occurred after stepping on a screw in a junkyard 4 days ago. Pt is not up to date on tetanus. Pt has a history of foot injuries and ulcers. Pt was seen by podiatrist today who sent pt to the ED. On exam there is a puncture wound on the base of the 1st metatarsal that is swollen, warm, and erythematous. Probable infected wound. Will administer tetanus vaccine, obtain x-rays and administer IV antibiotics, will consult podiatry and admit pt for further management.    Dr. Moreno (Attending Physician)

## 2024-08-06 NOTE — CONSULT NOTE ADULT - SUBJECTIVE AND OBJECTIVE BOX
GENERAL SURGERY CONSULT NOTE  Consulting surgical team: Vascular Surgery  Consulting attending: Dr. Stack    HPI:  42yo M with Hx HTN, HLD, DM (c/b diabetic neuropathy) who p/w R foot wound after stepping on a screw 4 days ago. As the wound did not heal, he saw his OP podiatrist who sent him to ED due to c/f wound infection. He was found to be in hypertensive urgency (/133) and to have leukocytosis (WBC 15). XR R foot demonstrated productive changes at 5th metatarsal base w/o osseous destruction. He was admitted for IV Abx. Vascular Surgery consulted for management and evaluation for possible revascularization.     PAST MEDICAL HISTORY:  Diabetes  Diabetic neuropathy  HTN (hypertension)  HLD (hyperlipidemia)  DM (diabetes mellitus)    PAST SURGICAL HISTORY:  No significant past surgical history    MEDICATIONS:  dextrose 5%. 1000 milliLiter(s) IV Continuous <Continuous>  dextrose 5%. 1000 milliLiter(s) IV Continuous <Continuous>  dextrose 50% Injectable 25 Gram(s) IV Push once  dextrose 50% Injectable 12.5 Gram(s) IV Push once  dextrose 50% Injectable 25 Gram(s) IV Push once  dextrose Oral Gel 15 Gram(s) Oral once PRN  enoxaparin Injectable 40 milliGRAM(s) SubCutaneous every 24 hours  glucagon  Injectable 1 milliGRAM(s) IntraMuscular once  insulin glargine Injectable (LANTUS) 10 Unit(s) SubCutaneous at bedtime  insulin lispro (ADMELOG) corrective regimen sliding scale   SubCutaneous three times a day before meals  insulin lispro Injectable (ADMELOG) 4 Unit(s) SubCutaneous three times a day before meals  piperacillin/tazobactam IVPB.. 3.375 Gram(s) IV Intermittent every 8 hours  valsartan 160 milliGRAM(s) Oral daily    ALLERGIES:  No Known Allergies    VITALS & I/Os:  Vital Signs Last 24 Hrs  T(C): 37.2 (06 Aug 2024 10:35), Max: 37.2 (06 Aug 2024 10:35)  T(F): 99 (06 Aug 2024 10:35), Max: 99 (06 Aug 2024 10:35)  HR: 90 (06 Aug 2024 13:59) (90 - 97)  BP: 210/125 (06 Aug 2024 13:59) (186/115 - 216/133)  RR: 18 (06 Aug 2024 13:59) (18 - 20)  SpO2: 98% (06 Aug 2024 13:59) (98% - 99%)    Parameters below as of 06 Aug 2024 13:59  Patient On (Oxygen Delivery Method): room air    PHYSICAL EXAM:  GEN: resting in bed comfortably in NAD  NEURO: awake, alert  CV: warm, well-perfused  RESP: no increased WOB  ABD: soft, non-distended, non-tender without rebound tenderness or guarding  EXTR: no gross deformities; spontaneous movement in b/l U/L extrem     LABS:             14.4   15.09 )-----------( 246      ( 06 Aug 2024 11:22 )             41.2     136  |  95<L>  |  9   ----------------------------<  268<H>  3.8   |  28  |  0.82    Ca    9.4      06 Aug 2024 11:22    TPro  8.0  /  Alb  3.7  /  TBili  0.8  /  DBili  x   /  AST  12  /  ALT  10  /  AlkPhos  85  08-06    Lactate: Lactate, Blood: 1.7 mmol/L (- @ 15:19)   08- @ 11:15  2.1    Urinalysis Basic - ( 06 Aug 2024 13:33 )    Color: Yellow / Appearance: Clear / S.014 / pH: x  Gluc: x / Ketone: 15 mg/dL  / Bili: Negative / Urobili: 0.2 mg/dL   Blood: x / Protein: 100 mg/dL / Nitrite: Negative   Leuk Esterase: Negative / RBC: 6 /HPF / WBC 0 /HPF   Sq Epi: x / Non Sq Epi: 1 /HPF / Bacteria: Negative /HPF    IMAGING:  < from: Xray Foot AP + Lateral + Oblique, Right (24 @ 09:49) >  FINDINGS/  IMPRESSION:  Patient is status post resection of the second metatarsal head. Surgical   margin is smooth without cortical irregularity. There is no osseous   destruction or aggressive periosteal reaction. Productive changes are   seen about the base of the fifth metatarsal. There is retrocalcaneal   enthesophyte formation. Spurring about the dorsal navicular.    No radiopaque foreign body. No fracture. No dislocation. Vascular   calcification is present. Scattered soft tissue calcifications about the   lower leg.    < end of copied text >   GENERAL SURGERY CONSULT NOTE  Consulting surgical team: Vascular Surgery  Consulting attending: Dr. Stack    HPI:  43yo M with Hx HTN, HLD, DM (c/b diabetic neuropathy) who p/w R foot wound after stepping on a screw 4 days ago. As the wound did not heal, he saw his OP podiatrist who sent him to ED due to c/f wound infection. He was found to be in hypertensive urgency (/133) and to have leukocytosis (WBC 15). XR R foot demonstrated productive changes at 5th metatarsal base w/o osseous destruction. He was admitted for IV Abx. Vascular Surgery consulted for management and evaluation for possible revascularization.     PAST MEDICAL HISTORY:  Diabetes  Diabetic neuropathy  HTN (hypertension)  HLD (hyperlipidemia)  DM (diabetes mellitus)    PAST SURGICAL HISTORY:  No significant past surgical history    MEDICATIONS:  dextrose 5%. 1000 milliLiter(s) IV Continuous <Continuous>  dextrose 5%. 1000 milliLiter(s) IV Continuous <Continuous>  dextrose 50% Injectable 25 Gram(s) IV Push once  dextrose 50% Injectable 12.5 Gram(s) IV Push once  dextrose 50% Injectable 25 Gram(s) IV Push once  dextrose Oral Gel 15 Gram(s) Oral once PRN  enoxaparin Injectable 40 milliGRAM(s) SubCutaneous every 24 hours  glucagon  Injectable 1 milliGRAM(s) IntraMuscular once  insulin glargine Injectable (LANTUS) 10 Unit(s) SubCutaneous at bedtime  insulin lispro (ADMELOG) corrective regimen sliding scale   SubCutaneous three times a day before meals  insulin lispro Injectable (ADMELOG) 4 Unit(s) SubCutaneous three times a day before meals  piperacillin/tazobactam IVPB.. 3.375 Gram(s) IV Intermittent every 8 hours  valsartan 160 milliGRAM(s) Oral daily    ALLERGIES:  No Known Allergies    VITALS & I/Os:  Vital Signs Last 24 Hrs  T(C): 37.2 (06 Aug 2024 10:35), Max: 37.2 (06 Aug 2024 10:35)  T(F): 99 (06 Aug 2024 10:35), Max: 99 (06 Aug 2024 10:35)  HR: 90 (06 Aug 2024 13:59) (90 - 97)  BP: 210/125 (06 Aug 2024 13:59) (186/115 - 216/133)  RR: 18 (06 Aug 2024 13:59) (18 - 20)  SpO2: 98% (06 Aug 2024 13:59) (98% - 99%)    Parameters below as of 06 Aug 2024 13:59  Patient On (Oxygen Delivery Method): room air    PHYSICAL EXAM:  GEN: resting in bed comfortably in NAD  NEURO: awake, alert  CV: warm, well-perfused  RESP: no increased WOB  ABD: soft, non-distended, non-tender without rebound tenderness or guarding  EXTR: no gross deformities; spontaneous movement in b/l U/L extrem     LABS:             14.4   15.09 )-----------( 246      ( 06 Aug 2024 11:22 )             41.2     136  |  95<L>  |  9   ----------------------------<  268<H>  3.8   |  28  |  0.82    Ca    9.4      06 Aug 2024 11:22    TPro  8.0  /  Alb  3.7  /  TBili  0.8  /  DBili  x   /  AST  12  /  ALT  10  /  AlkPhos  85  08-06    Lactate: Lactate, Blood: 1.7 mmol/L (- @ 15:19)   08- @ 11:15  2.1    Urinalysis Basic - ( 06 Aug 2024 13:33 )    Color: Yellow / Appearance: Clear / S.014 / pH: x  Gluc: x / Ketone: 15 mg/dL  / Bili: Negative / Urobili: 0.2 mg/dL   Blood: x / Protein: 100 mg/dL / Nitrite: Negative   Leuk Esterase: Negative / RBC: 6 /HPF / WBC 0 /HPF   Sq Epi: x / Non Sq Epi: 1 /HPF / Bacteria: Negative /HPF    IMAGING:  < from: Xray Foot AP + Lateral + Oblique, Right (24 @ 09:49) >  FINDINGS/  IMPRESSION:  Patient is status post resection of the second metatarsal head. Surgical   margin is smooth without cortical irregularity. There is no osseous   destruction or aggressive periosteal reaction. Productive changes are   seen about the base of the fifth metatarsal. There is retrocalcaneal   enthesophyte formation. Spurring about the dorsal navicular.    No radiopaque foreign body. No fracture. No dislocation. Vascular   calcification is present. Scattered soft tissue calcifications about the   lower leg.    < end of copied text >   GENERAL SURGERY CONSULT NOTE  Consulting surgical team: Vascular Surgery  Consulting attending: Dr. Stack    HPI:  43yo M with Hx HTN, HLD, DM (c/b diabetic neuropathy) who p/w R foot wound after stepping on a screw 4 days ago. As the wound did not heal, he saw his OP podiatrist who sent him to ED due to c/f wound infection. He was found to be in hypertensive urgency (/133) and to have leukocytosis (WBC 15). XR R foot demonstrated productive changes at 5th metatarsal base w/o osseous destruction. He was admitted for IV Abx. Vascular Surgery consulted for evaluation. Patient notes a history of right foot ulcer which required     PAST MEDICAL HISTORY:  Diabetes  Diabetic neuropathy  HTN (hypertension)  HLD (hyperlipidemia)  DM (diabetes mellitus)    PAST SURGICAL HISTORY:  No significant past surgical history    MEDICATIONS:  dextrose 5%. 1000 milliLiter(s) IV Continuous <Continuous>  dextrose 5%. 1000 milliLiter(s) IV Continuous <Continuous>  dextrose 50% Injectable 25 Gram(s) IV Push once  dextrose 50% Injectable 12.5 Gram(s) IV Push once  dextrose 50% Injectable 25 Gram(s) IV Push once  dextrose Oral Gel 15 Gram(s) Oral once PRN  enoxaparin Injectable 40 milliGRAM(s) SubCutaneous every 24 hours  glucagon  Injectable 1 milliGRAM(s) IntraMuscular once  insulin glargine Injectable (LANTUS) 10 Unit(s) SubCutaneous at bedtime  insulin lispro (ADMELOG) corrective regimen sliding scale   SubCutaneous three times a day before meals  insulin lispro Injectable (ADMELOG) 4 Unit(s) SubCutaneous three times a day before meals  piperacillin/tazobactam IVPB.. 3.375 Gram(s) IV Intermittent every 8 hours  valsartan 160 milliGRAM(s) Oral daily    ALLERGIES:  No Known Allergies    VITALS & I/Os:  Vital Signs Last 24 Hrs  T(C): 37.2 (06 Aug 2024 10:35), Max: 37.2 (06 Aug 2024 10:35)  T(F): 99 (06 Aug 2024 10:35), Max: 99 (06 Aug 2024 10:35)  HR: 90 (06 Aug 2024 13:59) (90 - 97)  BP: 210/125 (06 Aug 2024 13:59) (186/115 - 216/133)  RR: 18 (06 Aug 2024 13:59) (18 - 20)  SpO2: 98% (06 Aug 2024 13:59) (98% - 99%)    Parameters below as of 06 Aug 2024 13:59  Patient On (Oxygen Delivery Method): room air    PHYSICAL EXAM:  GEN: resting in bed comfortably in NAD  NEURO: awake, alert and oriented x4  CV: warm, well-perfused  RESP: nonlabored breathing on RA  EXTR: Grossly symmetric, WWP. R foot with wound probing to bone over 1st metatarsal head  VASC: Palpable femorals, popliteals bilaterally. DP/PT ds+ bilaterally, brisk capillary refill b/l    LABS:             14.4   15.09 )-----------( 246      ( 06 Aug 2024 11:22 )             41.2     136  |  95<L>  |  9   ----------------------------<  268<H>  3.8   |  28  |  0.82    Ca    9.4      06 Aug 2024 11:22    TPro  8.0  /  Alb  3.7  /  TBili  0.8  /  DBili  x   /  AST  12  /  ALT  10  /  AlkPhos  85  08-    Lactate: Lactate, Blood: 1.7 mmol/L ( @ 15:19)   - @ 11:15  2.1    Urinalysis Basic - ( 06 Aug 2024 13:33 )    Color: Yellow / Appearance: Clear / S.014 / pH: x  Gluc: x / Ketone: 15 mg/dL  / Bili: Negative / Urobili: 0.2 mg/dL   Blood: x / Protein: 100 mg/dL / Nitrite: Negative   Leuk Esterase: Negative / RBC: 6 /HPF / WBC 0 /HPF   Sq Epi: x / Non Sq Epi: 1 /HPF / Bacteria: Negative /HPF    IMAGING:  < from: Xray Foot AP + Lateral + Oblique, Right (24 @ 09:49) >  FINDINGS/  IMPRESSION:  Patient is status post resection of the second metatarsal head. Surgical   margin is smooth without cortical irregularity. There is no osseous   destruction or aggressive periosteal reaction. Productive changes are   seen about the base of the fifth metatarsal. There is retrocalcaneal   enthesophyte formation. Spurring about the dorsal navicular.    No radiopaque foreign body. No fracture. No dislocation. Vascular   calcification is present. Scattered soft tissue calcifications about the   lower leg.    < end of copied text >   GENERAL SURGERY CONSULT NOTE  Consulting surgical team: Vascular Surgery  Consulting attending: Dr. Stack    HPI:  43yo M with Hx HTN, HLD, DM (c/b diabetic neuropathy) who p/w R foot wound after stepping on a screw 4 days ago. As the wound did not heal, he saw his OP podiatrist who sent him to ED due to c/f wound infection. He was found to be in hypertensive urgency (/133) and to have leukocytosis (WBC 15). XR R foot demonstrated productive changes at 5th metatarsal base w/o osseous destruction. He was admitted for IV Abx. Vascular Surgery consulted for evaluation. Patient notes a history of right foot ulcer which required 2nd metatarsal head resection over 1 year ago.    PAST MEDICAL HISTORY:  Diabetes  Diabetic neuropathy  HTN (hypertension)  HLD (hyperlipidemia)  DM (diabetes mellitus)    PAST SURGICAL HISTORY:  2nd metatarsal head resection    MEDICATIONS:  dextrose 5%. 1000 milliLiter(s) IV Continuous <Continuous>  dextrose 5%. 1000 milliLiter(s) IV Continuous <Continuous>  dextrose 50% Injectable 25 Gram(s) IV Push once  dextrose 50% Injectable 12.5 Gram(s) IV Push once  dextrose 50% Injectable 25 Gram(s) IV Push once  dextrose Oral Gel 15 Gram(s) Oral once PRN  enoxaparin Injectable 40 milliGRAM(s) SubCutaneous every 24 hours  glucagon  Injectable 1 milliGRAM(s) IntraMuscular once  insulin glargine Injectable (LANTUS) 10 Unit(s) SubCutaneous at bedtime  insulin lispro (ADMELOG) corrective regimen sliding scale   SubCutaneous three times a day before meals  insulin lispro Injectable (ADMELOG) 4 Unit(s) SubCutaneous three times a day before meals  piperacillin/tazobactam IVPB.. 3.375 Gram(s) IV Intermittent every 8 hours  valsartan 160 milliGRAM(s) Oral daily    ALLERGIES:  No Known Allergies    VITALS & I/Os:  Vital Signs Last 24 Hrs  T(C): 37.2 (06 Aug 2024 10:35), Max: 37.2 (06 Aug 2024 10:35)  T(F): 99 (06 Aug 2024 10:35), Max: 99 (06 Aug 2024 10:35)  HR: 90 (06 Aug 2024 13:59) (90 - 97)  BP: 210/125 (06 Aug 2024 13:59) (186/115 - 216/133)  RR: 18 (06 Aug 2024 13:59) (18 - 20)  SpO2: 98% (06 Aug 2024 13:59) (98% - 99%)    Parameters below as of 06 Aug 2024 13:59  Patient On (Oxygen Delivery Method): room air    PHYSICAL EXAM:  GEN: resting in bed comfortably in NAD  NEURO: awake, alert and oriented x4  CV: warm, well-perfused  RESP: nonlabored breathing on RA  EXTR: Grossly symmetric, WWP. R plantar foot with 1cm wound over 1st metatarsal head with packing in place, no active drainage. Healed ulcer over 2nd metatarsal head  VASC: Palpable femorals, popliteals bilaterally. DP/PT ds+ bilaterally, brisk capillary refill b/l    LABS:             14.4   15.09 )-----------( 246      ( 06 Aug 2024 11:22 )             41.2     136  |  95<L>  |  9   ----------------------------<  268<H>  3.8   |  28  |  0.82    Ca    9.4      06 Aug 2024 11:22    TPro  8.0  /  Alb  3.7  /  TBili  0.8  /  DBili  x   /  AST  12  /  ALT  10  /  AlkPhos  85  08-    Lactate: Lactate, Blood: 1.7 mmol/L ( @ 15:19)   08- @ 11:15  2.1    Urinalysis Basic - ( 06 Aug 2024 13:33 )    Color: Yellow / Appearance: Clear / S.014 / pH: x  Gluc: x / Ketone: 15 mg/dL  / Bili: Negative / Urobili: 0.2 mg/dL   Blood: x / Protein: 100 mg/dL / Nitrite: Negative   Leuk Esterase: Negative / RBC: 6 /HPF / WBC 0 /HPF   Sq Epi: x / Non Sq Epi: 1 /HPF / Bacteria: Negative /HPF    IMAGING:  < from: Xray Foot AP + Lateral + Oblique, Right (24 @ 09:49) >  FINDINGS/  IMPRESSION:  Patient is status post resection of the second metatarsal head. Surgical   margin is smooth without cortical irregularity. There is no osseous   destruction or aggressive periosteal reaction. Productive changes are   seen about the base of the fifth metatarsal. There is retrocalcaneal   enthesophyte formation. Spurring about the dorsal navicular.    No radiopaque foreign body. No fracture. No dislocation. Vascular   calcification is present. Scattered soft tissue calcifications about the   lower leg.    < end of copied text >

## 2024-08-06 NOTE — H&P ADULT - NSHPREVIEWOFSYSTEMS_GEN_ALL_CORE
Gen: no loss of wt no loss of appetite no chills   ENT: no dizziness no hearing loss  Ophth: no blurring of vision no loss of vision  Resp: No cough no sputum production  CVS: No chest pain no palpitations no orthopnea  GI: no nausea, vomiting or diarrhea   : no dysuria, hematuria  Endo: no polyuria no excessive sweating  Neuro: no weakness no paresthesias  Heme: No petechiae no easy bruising  Msk: No joint pain no swelling  Skin: No rash no itching

## 2024-08-06 NOTE — CONSULT NOTE ADULT - ASSESSMENT
44M presents with R foot wound s/p stepping on a nail on 8/2  - Pt seen and evaluated   - Afebrile, WBC 15.09  - R foot wound sub first metatarsal head wound periosteum with scant purulent drainage, no tracking or tunneling, no soft tissue crepitus or fluctuance, no malodor, sub 2nd metatarsal hyperkeratosis with no underlying wound. L foot no open wounds or signs of infection   - R foot XR: no FB, no OM, no gas  - Will hold off on MRI 2/2 to ID recs  - R foot wound culture   - Rec admit to med  - ID recs appreciated   - Vasc recs appreciated   - Rec Tetanus vaccine   - Rec IV Vanco/Zosyn   - Pod plan Local wound care vs partial first ray resection pending ID/Vasc recs   - Please document medical clearance for possible podiatric surgical intervention   - Seen with attending

## 2024-08-06 NOTE — CONSULT NOTE ADULT - ATTENDING COMMENTS
44 year old man with diabetic right foot ulcer  likely osteomyelitis  consult to evaluate for peripheral arterial disease  TAMI/PVRs with toe pressures reviewed - normal  wound care/surgical intervention not contraindicated from vascular standpoint  recommend to optimize blood glucose control  IV antibiotics as per ID  recall vascular surgery with any further questions or concerns

## 2024-08-07 LAB
-  BLOOD PCR PANEL: SIGNIFICANT CHANGE UP
A1C WITH ESTIMATED AVERAGE GLUCOSE RESULT: 11.5 % — HIGH (ref 4–5.6)
ANION GAP SERPL CALC-SCNC: 13 MMOL/L — SIGNIFICANT CHANGE UP (ref 5–17)
BUN SERPL-MCNC: 8 MG/DL — SIGNIFICANT CHANGE UP (ref 7–23)
CALCIUM SERPL-MCNC: 9.2 MG/DL — SIGNIFICANT CHANGE UP (ref 8.4–10.5)
CHLORIDE SERPL-SCNC: 95 MMOL/L — LOW (ref 96–108)
CHOLEST SERPL-MCNC: 171 MG/DL — SIGNIFICANT CHANGE UP
CO2 SERPL-SCNC: 27 MMOL/L — SIGNIFICANT CHANGE UP (ref 22–31)
CREAT SERPL-MCNC: 0.84 MG/DL — SIGNIFICANT CHANGE UP (ref 0.5–1.3)
EGFR: 110 ML/MIN/1.73M2 — SIGNIFICANT CHANGE UP
ESTIMATED AVERAGE GLUCOSE: 283 MG/DL — HIGH (ref 68–114)
FOLATE SERPL-MCNC: 12.9 NG/ML — SIGNIFICANT CHANGE UP
GLUCOSE BLDC GLUCOMTR-MCNC: 198 MG/DL — HIGH (ref 70–99)
GLUCOSE BLDC GLUCOMTR-MCNC: 228 MG/DL — HIGH (ref 70–99)
GLUCOSE BLDC GLUCOMTR-MCNC: 237 MG/DL — HIGH (ref 70–99)
GLUCOSE BLDC GLUCOMTR-MCNC: 260 MG/DL — HIGH (ref 70–99)
GLUCOSE SERPL-MCNC: 242 MG/DL — HIGH (ref 70–99)
GRAM STN FLD: ABNORMAL
GRAM STN FLD: ABNORMAL
HCT VFR BLD CALC: 40.1 % — SIGNIFICANT CHANGE UP (ref 39–50)
HDLC SERPL-MCNC: 42 MG/DL — SIGNIFICANT CHANGE UP
HGB BLD-MCNC: 13.6 G/DL — SIGNIFICANT CHANGE UP (ref 13–17)
LIPID PNL WITH DIRECT LDL SERPL: 108 MG/DL — HIGH
MCHC RBC-ENTMCNC: 30.6 PG — SIGNIFICANT CHANGE UP (ref 27–34)
MCHC RBC-ENTMCNC: 33.9 GM/DL — SIGNIFICANT CHANGE UP (ref 32–36)
MCV RBC AUTO: 90.1 FL — SIGNIFICANT CHANGE UP (ref 80–100)
METHOD TYPE: SIGNIFICANT CHANGE UP
MRSA PCR RESULT.: SIGNIFICANT CHANGE UP
MRSA PCR RESULT.: SIGNIFICANT CHANGE UP
NON HDL CHOLESTEROL: 129 MG/DL — SIGNIFICANT CHANGE UP
NRBC # BLD: 0 /100 WBCS — SIGNIFICANT CHANGE UP (ref 0–0)
PLATELET # BLD AUTO: 302 K/UL — SIGNIFICANT CHANGE UP (ref 150–400)
POTASSIUM SERPL-MCNC: 3.5 MMOL/L — SIGNIFICANT CHANGE UP (ref 3.5–5.3)
POTASSIUM SERPL-SCNC: 3.5 MMOL/L — SIGNIFICANT CHANGE UP (ref 3.5–5.3)
RBC # BLD: 4.45 M/UL — SIGNIFICANT CHANGE UP (ref 4.2–5.8)
RBC # FLD: 11.9 % — SIGNIFICANT CHANGE UP (ref 10.3–14.5)
S AUREUS DNA NOSE QL NAA+PROBE: DETECTED
S AUREUS DNA NOSE QL NAA+PROBE: DETECTED
SODIUM SERPL-SCNC: 135 MMOL/L — SIGNIFICANT CHANGE UP (ref 135–145)
SPECIMEN SOURCE: SIGNIFICANT CHANGE UP
TRIGL SERPL-MCNC: 114 MG/DL — SIGNIFICANT CHANGE UP
TSH SERPL-MCNC: 1.67 UIU/ML — SIGNIFICANT CHANGE UP (ref 0.27–4.2)
VIT B12 SERPL-MCNC: 507 PG/ML — SIGNIFICANT CHANGE UP (ref 232–1245)
WBC # BLD: 14.17 K/UL — HIGH (ref 3.8–10.5)
WBC # FLD AUTO: 14.17 K/UL — HIGH (ref 3.8–10.5)

## 2024-08-07 PROCEDURE — 99232 SBSQ HOSP IP/OBS MODERATE 35: CPT

## 2024-08-07 PROCEDURE — 93923 UPR/LXTR ART STDY 3+ LVLS: CPT | Mod: 26

## 2024-08-07 RX ORDER — CHLORHEXIDINE GLUCONATE 500 MG/1
1 CLOTH TOPICAL
Refills: 0 | Status: DISCONTINUED | OUTPATIENT
Start: 2024-08-07 | End: 2024-08-09

## 2024-08-07 RX ORDER — NIFEDIPINE 20 MG/1
60 CAPSULE, LIQUID FILLED ORAL DAILY
Refills: 0 | Status: DISCONTINUED | OUTPATIENT
Start: 2024-08-07 | End: 2024-08-09

## 2024-08-07 RX ORDER — INSULIN LISPRO 100/ML
6 VIAL (ML) SUBCUTANEOUS
Refills: 0 | Status: ACTIVE | OUTPATIENT
Start: 2024-08-07 | End: 2025-07-06

## 2024-08-07 RX ORDER — INSULIN GLARGINE-YFGN 100 [IU]/ML
16 INJECTION, SOLUTION SUBCUTANEOUS AT BEDTIME
Refills: 0 | Status: ACTIVE | OUTPATIENT
Start: 2024-08-07 | End: 2025-07-06

## 2024-08-07 RX ADMIN — Medication 2: at 12:16

## 2024-08-07 RX ADMIN — PIPERACILLIN SODIUM, TAZOBACTAM SODIUM 25 GRAM(S): 3; .375 INJECTION, POWDER, LYOPHILIZED, FOR SOLUTION INTRAVENOUS at 06:08

## 2024-08-07 RX ADMIN — Medication 6 UNIT(S): at 17:38

## 2024-08-07 RX ADMIN — ENOXAPARIN SODIUM 40 MILLIGRAM(S): 120 INJECTION SUBCUTANEOUS at 06:08

## 2024-08-07 RX ADMIN — NIFEDIPINE 60 MILLIGRAM(S): 20 CAPSULE, LIQUID FILLED ORAL at 10:54

## 2024-08-07 RX ADMIN — PIPERACILLIN SODIUM, TAZOBACTAM SODIUM 25 GRAM(S): 3; .375 INJECTION, POWDER, LYOPHILIZED, FOR SOLUTION INTRAVENOUS at 21:41

## 2024-08-07 RX ADMIN — Medication 2: at 17:37

## 2024-08-07 RX ADMIN — Medication 4 UNIT(S): at 12:16

## 2024-08-07 RX ADMIN — MUPIROCIN CALCIUM 1 APPLICATION(S): 20 CREAM TOPICAL at 07:00

## 2024-08-07 RX ADMIN — INSULIN GLARGINE-YFGN 16 UNIT(S): 100 INJECTION, SOLUTION SUBCUTANEOUS at 21:41

## 2024-08-07 RX ADMIN — Medication 3: at 07:45

## 2024-08-07 RX ADMIN — VALSARTAN 160 MILLIGRAM(S): 40 TABLET, FILM COATED ORAL at 06:08

## 2024-08-07 RX ADMIN — PIPERACILLIN SODIUM, TAZOBACTAM SODIUM 25 GRAM(S): 3; .375 INJECTION, POWDER, LYOPHILIZED, FOR SOLUTION INTRAVENOUS at 14:40

## 2024-08-07 RX ADMIN — Medication 4 UNIT(S): at 07:46

## 2024-08-07 NOTE — PROGRESS NOTE ADULT - ASSESSMENT
44M presents with right foot wound s/p stepping on a nail on 8/2  -Patient seen and evaluated   -Afebrile, WBC 14.17  -Right foot wound sub first metatarsal head wound periosteum with no drainage, no tracking or tunneling, no soft tissue crepitus or fluctuance, no malodor, sub 2nd metatarsal hyperkeratosis with no underlying wound. Left foot no open wounds or signs of infection  -Right foot X-Ray: no FB, no OM, no gas  -Will hold off on MRI 2/2 to ID recs  -Right foot wound culture- pending   -ID recs, appreciated   -Vasc recs, appreciated   -Pod plan local wound care vs partial first ray resection pending ID recs   - Please document medical clearance for possible podiatric surgical intervention   -Discussed with attending

## 2024-08-07 NOTE — PROGRESS NOTE ADULT - ASSESSMENT
43 y/o M PMH non insulin dependent type 2 diabetes, peripheral diabetic neuropathy presents w/ R foot injury after stepping on a screw now clinical presentation consistent with diabetic right foot infection with associated sepsis and uncontrolled HTN

## 2024-08-07 NOTE — PROGRESS NOTE ADULT - ASSESSMENT
A/P: 43 y/o M PMH non insulin dependent type 2 diabetes, peripheral diabetic neuropathy presents w/ R foot injury after [place] a [place] 4 days ago. The patient went to see his outpatient podiatry attending today who sent him to the Emergency Department for right  foot wound draining pus and probing to the bone to r/o osteomyelitis and for IV antibiotics. Endocrinology was consulted for management of diabetes mellitus.    #Type 2 Diabetes Mellitus  - HbA1c pending this admission. States that it was about 6.5% in March 2024. In the chart, Jan 2021, A1c is 6.4%  - States BG at home is in the 80-100s   ; home regimen: MFM 500mg BID (states that prior to March, patient was on Trulicity 1.5mg weekly and MFM 1000mg BID)  -  Unclear patient's most recent A1c- currently pending.   -egfr: 111    Plan:   - Recommend to increase to 16 units of lantus QHS  - Recommend to increase to 6 units of Admelog TIDQAC  - Recommend moderate Admelog correction scale TIDQAC and QHS  - Please check FSG before meals and QHS, or q6h while NPO  - Inpatient glucose goal 100-180   - Please keep patient on a diabetic, carb controlled diet   - hypoglycemia orderset prn  - Discharge planning: Will depend on patient's HgbA1c and inpatient glucose trend. Can consider starting patient on GLP-1 agonist at discharge.     #Hypertension  - BP goal <130/80  - Management as per primary team    #HLD  - Goal LDL <70 in the setting of diabetes  - Please check fasting lipid panel if not checked recently     #Obesity  - Consider initiating GLP-1 agonist at discharge.     Spoke with primary team regarding plan.     Misa Argueta, DO  Attending Physician   Department of Endocrinology, Diabetes and Metabolism     If before 9AM or after 5PM, or on weekends/holidays, please call the Endocrine answering service for assistance ([phone]).  For nonurgent matters, please email [email or web address] for assistance.     Please note that a different provider may be following this patient each day.          A/P: 43 y/o M PMH non insulin dependent type 2 diabetes, peripheral diabetic neuropathy presents w/ R foot injury after stepping on a screw [place] a [place] 4 days ago. The patient went to see his outpatient podiatry attending today who sent him to the Emergency Department for right  foot wound draining pus and probing to the bone to r/o osteomyelitis and for IV antibiotics. Endocrinology was consulted for management of diabetes mellitus.    #Type 2 Diabetes Mellitus  - HbA1c pending this admission. States that it was about 6.5% in March 2024. In the chart, Jan 2021, A1c is 6.4%  - States BG at home is in the 80-100s   ; home regimen: MFM 500mg BID (states that prior to March, patient was on Trulicity 1.5mg weekly and MFM 1000mg BID)  -  Unclear patient's most recent A1c- currently pending.   - Possible BG elevated in the setting of active infection, pain and antibiotics.   -egfr: 111    Plan:   - Recommend to increase to 16 units of lantus QHS  - Recommend to increase to 6 units of Admelog TIDQAC  - Recommend moderate Admelog correction scale TIDQAC and QHS  - Please check FSG before meals and QHS, or q6h while NPO  - Inpatient glucose goal 100-180   - Please keep patient on a diabetic, carb controlled diet   - hypoglycemia orderset prn  - Discharge planning: Will depend on patient's HgbA1c and inpatient glucose trend. Can consider starting patient on GLP-1 agonist at discharge.     #Hypertension  - BP goal <130/80  - Management as per primary team    #HLD  - Goal LDL <70 in the setting of diabetes  - Please check fasting lipid panel if not checked recently     #Obesity  - Consider initiating GLP-1 agonist at discharge.     Spoke with primary team regarding plan.     Misa Argueta, DO  Attending Physician   Department of Endocrinology, Diabetes and Metabolism     If before 9AM or after 5PM, or on weekends/holidays, please call the Endocrine answering service for assistance ([phone]).  For nonurgent matters, please email [email or web address] for assistance.     Please note that a different provider may be following this patient each day.

## 2024-08-07 NOTE — PROGRESS NOTE ADULT - ASSESSMENT
ASSESSMENT: 43yo M with Hx HTN, HLD, DM (c/b diabetic neuropathy) and prior who p/w R foot wound after stepping on a screw 4 days ago with purulence from wound probing to bone, admitted for IV abx for osteomyelitis and uncontrolled hypertension. Vascular Surgery consulted for evaluation for possible revascularization.     PLAN:   - Please obtain TAMI/PVRs with toe pressures  - Podiatry planning local wound care vs. partial 1st ray resection  - Abx per ID  - Global care per primary    Vascular Surgery  [id number]

## 2024-08-07 NOTE — PROGRESS NOTE ADULT - ASSESSMENT
44 year old with   DM for 20 years  not on insulin.  Hx of metatarsal resection 2 years ago over 4  th toe.  Neuropathy   good pulses   5  days ago stepped on a s crew that felt through his clogs and punctured the area under his first toe  Denies fever chills or pain but has mild cellulitis  over the dorsum of the foot and swelling at puncture site.     At risk risk for Pseudomonas infection after puncture    zosyn   ( can hold vanco)  MRSA nasal screen pending    for MRI tomorrow

## 2024-08-07 NOTE — PROVIDER CONTACT NOTE (CRITICAL VALUE NOTIFICATION) - TEST AND RESULT REPORTED:
Tissue culture with gram stain drawn 8/6 showed moderate polymorphonuclear leukocytes seen per low power field. Few gram positive cocci in pairs seen per oil power field

## 2024-08-07 NOTE — PROVIDER CONTACT NOTE (CRITICAL VALUE NOTIFICATION) - ASSESSMENT
Patient is AOx4, no complaints of pain or discomfort. Patient resting comfortably in bed at this time.

## 2024-08-08 ENCOUNTER — TRANSCRIPTION ENCOUNTER (OUTPATIENT)
Age: 44
End: 2024-08-08

## 2024-08-08 LAB
-  AMOXICILLIN/CLAVULANIC ACID: SIGNIFICANT CHANGE UP
-  AMPICILLIN/SULBACTAM: SIGNIFICANT CHANGE UP
-  AMPICILLIN: SIGNIFICANT CHANGE UP
-  AZTREONAM: SIGNIFICANT CHANGE UP
-  CEFAZOLIN: SIGNIFICANT CHANGE UP
-  CEFEPIME: SIGNIFICANT CHANGE UP
-  CEFOXITIN: SIGNIFICANT CHANGE UP
-  CEFTRIAXONE: SIGNIFICANT CHANGE UP
-  CIPROFLOXACIN: SIGNIFICANT CHANGE UP
-  CLINDAMYCIN: SIGNIFICANT CHANGE UP
-  CLINDAMYCIN: SIGNIFICANT CHANGE UP
-  ERTAPENEM: SIGNIFICANT CHANGE UP
-  ERYTHROMYCIN: SIGNIFICANT CHANGE UP
-  ERYTHROMYCIN: SIGNIFICANT CHANGE UP
-  GENTAMICIN: SIGNIFICANT CHANGE UP
-  IMIPENEM: SIGNIFICANT CHANGE UP
-  LEVOFLOXACIN: SIGNIFICANT CHANGE UP
-  MEROPENEM: SIGNIFICANT CHANGE UP
-  OXACILLIN: SIGNIFICANT CHANGE UP
-  OXACILLIN: SIGNIFICANT CHANGE UP
-  PENICILLIN: SIGNIFICANT CHANGE UP
-  PENICILLIN: SIGNIFICANT CHANGE UP
-  PIPERACILLIN/TAZOBACTAM: SIGNIFICANT CHANGE UP
-  RIFAMPIN: SIGNIFICANT CHANGE UP
-  RIFAMPIN: SIGNIFICANT CHANGE UP
-  TETRACYCLINE: SIGNIFICANT CHANGE UP
-  TETRACYCLINE: SIGNIFICANT CHANGE UP
-  TOBRAMYCIN: SIGNIFICANT CHANGE UP
-  TRIMETHOPRIM/SULFAMETHOXAZOLE: SIGNIFICANT CHANGE UP
-  VANCOMYCIN: SIGNIFICANT CHANGE UP
-  VANCOMYCIN: SIGNIFICANT CHANGE UP
ANION GAP SERPL CALC-SCNC: 14 MMOL/L — SIGNIFICANT CHANGE UP (ref 5–17)
BUN SERPL-MCNC: 10 MG/DL — SIGNIFICANT CHANGE UP (ref 7–23)
CALCIUM SERPL-MCNC: 9.4 MG/DL — SIGNIFICANT CHANGE UP (ref 8.4–10.5)
CHLORIDE SERPL-SCNC: 96 MMOL/L — SIGNIFICANT CHANGE UP (ref 96–108)
CO2 SERPL-SCNC: 26 MMOL/L — SIGNIFICANT CHANGE UP (ref 22–31)
CREAT SERPL-MCNC: 0.85 MG/DL — SIGNIFICANT CHANGE UP (ref 0.5–1.3)
CULTURE RESULTS: ABNORMAL
EGFR: 110 ML/MIN/1.73M2 — SIGNIFICANT CHANGE UP
GLUCOSE BLDC GLUCOMTR-MCNC: 170 MG/DL — HIGH (ref 70–99)
GLUCOSE BLDC GLUCOMTR-MCNC: 252 MG/DL — HIGH (ref 70–99)
GLUCOSE BLDC GLUCOMTR-MCNC: 270 MG/DL — HIGH (ref 70–99)
GLUCOSE BLDC GLUCOMTR-MCNC: 290 MG/DL — HIGH (ref 70–99)
GLUCOSE SERPL-MCNC: 241 MG/DL — HIGH (ref 70–99)
HCT VFR BLD CALC: 39.7 % — SIGNIFICANT CHANGE UP (ref 39–50)
HGB BLD-MCNC: 14 G/DL — SIGNIFICANT CHANGE UP (ref 13–17)
MCHC RBC-ENTMCNC: 31.1 PG — SIGNIFICANT CHANGE UP (ref 27–34)
MCHC RBC-ENTMCNC: 35.3 GM/DL — SIGNIFICANT CHANGE UP (ref 32–36)
MCV RBC AUTO: 88.2 FL — SIGNIFICANT CHANGE UP (ref 80–100)
METHOD TYPE: SIGNIFICANT CHANGE UP
NRBC # BLD: 0 /100 WBCS — SIGNIFICANT CHANGE UP (ref 0–0)
ORGANISM # SPEC MICROSCOPIC CNT: ABNORMAL
ORGANISM # SPEC MICROSCOPIC CNT: ABNORMAL
PLATELET # BLD AUTO: 352 K/UL — SIGNIFICANT CHANGE UP (ref 150–400)
POTASSIUM SERPL-MCNC: 3.5 MMOL/L — SIGNIFICANT CHANGE UP (ref 3.5–5.3)
POTASSIUM SERPL-SCNC: 3.5 MMOL/L — SIGNIFICANT CHANGE UP (ref 3.5–5.3)
RBC # BLD: 4.5 M/UL — SIGNIFICANT CHANGE UP (ref 4.2–5.8)
RBC # FLD: 11.7 % — SIGNIFICANT CHANGE UP (ref 10.3–14.5)
SODIUM SERPL-SCNC: 136 MMOL/L — SIGNIFICANT CHANGE UP (ref 135–145)
SPECIMEN SOURCE: SIGNIFICANT CHANGE UP
WBC # BLD: 14.03 K/UL — HIGH (ref 3.8–10.5)
WBC # FLD AUTO: 14.03 K/UL — HIGH (ref 3.8–10.5)

## 2024-08-08 PROCEDURE — 73720 MRI LWR EXTREMITY W/O&W/DYE: CPT | Mod: 26,RT

## 2024-08-08 PROCEDURE — 99232 SBSQ HOSP IP/OBS MODERATE 35: CPT

## 2024-08-08 PROCEDURE — 99233 SBSQ HOSP IP/OBS HIGH 50: CPT

## 2024-08-08 RX ORDER — INSULIN GLARGINE-YFGN 100 [IU]/ML
19 INJECTION, SOLUTION SUBCUTANEOUS AT BEDTIME
Refills: 0 | Status: DISCONTINUED | OUTPATIENT
Start: 2024-08-08 | End: 2024-08-09

## 2024-08-08 RX ORDER — INSULIN LISPRO 100/ML
9 VIAL (ML) SUBCUTANEOUS
Refills: 0 | Status: DISCONTINUED | OUTPATIENT
Start: 2024-08-08 | End: 2024-08-09

## 2024-08-08 RX ADMIN — Medication 3: at 18:31

## 2024-08-08 RX ADMIN — Medication 3: at 09:40

## 2024-08-08 RX ADMIN — CHLORHEXIDINE GLUCONATE 1 APPLICATION(S): 500 CLOTH TOPICAL at 07:10

## 2024-08-08 RX ADMIN — MUPIROCIN CALCIUM 1 APPLICATION(S): 20 CREAM TOPICAL at 08:53

## 2024-08-08 RX ADMIN — PIPERACILLIN SODIUM, TAZOBACTAM SODIUM 25 GRAM(S): 3; .375 INJECTION, POWDER, LYOPHILIZED, FOR SOLUTION INTRAVENOUS at 21:27

## 2024-08-08 RX ADMIN — NIFEDIPINE 60 MILLIGRAM(S): 20 CAPSULE, LIQUID FILLED ORAL at 06:47

## 2024-08-08 RX ADMIN — ENOXAPARIN SODIUM 40 MILLIGRAM(S): 120 INJECTION SUBCUTANEOUS at 06:47

## 2024-08-08 RX ADMIN — VALSARTAN 160 MILLIGRAM(S): 40 TABLET, FILM COATED ORAL at 06:47

## 2024-08-08 RX ADMIN — Medication 6 UNIT(S): at 09:42

## 2024-08-08 RX ADMIN — INSULIN GLARGINE-YFGN 19 UNIT(S): 100 INJECTION, SOLUTION SUBCUTANEOUS at 21:27

## 2024-08-08 RX ADMIN — PIPERACILLIN SODIUM, TAZOBACTAM SODIUM 25 GRAM(S): 3; .375 INJECTION, POWDER, LYOPHILIZED, FOR SOLUTION INTRAVENOUS at 06:47

## 2024-08-08 RX ADMIN — Medication 9 UNIT(S): at 18:32

## 2024-08-08 NOTE — DIETITIAN INITIAL EVALUATION ADULT - PERTINENT LABORATORY DATA
08-08    136  |  96  |  10  ----------------------------<  241<H>  3.5   |  26  |  0.85    Ca    9.4      08 Aug 2024 07:28    POCT Blood Glucose.: 252 mg/dL (08-08-24 @ 12:48)  A1C with Estimated Average Glucose Result: 11.5 % (08-07-24 @ 07:15)

## 2024-08-08 NOTE — PROGRESS NOTE ADULT - ASSESSMENT
A/P: 45 y/o M PMH non insulin dependent type 2 diabetes, peripheral diabetic neuropathy presents w/ R foot injury after stepping on a screw at a junkyard 4 days ago. The patient went to see his outpatient podiatry attending today who sent him to the Emergency Department for right  foot wound draining pus and probing to the bone to r/o osteomyelitis and for IV antibiotics. Endocrinology was consulted for management of diabetes mellitus.Patient is high risk with high level decision making due to uncontrolled diabetes with A1C>10 which places patient at high risk for cardiovascular and cerebrovascular events. Patient with lability of glucose requiring close monitoring and insulin adjustments.    #Type 2 Diabetes Mellitus  - HbA1c 11.5 States that it was about 6.5% in March 2024. In the chart, Jan 2021, A1c is 6.4%  - States BG at home is in the 80-100s   ; home regimen: MFM 500mg BID (states that prior to March, patient was on Trulicity 1.5mg weekly and MFM 1000mg BID)  -egfr: 111    Plan:   - Recommend to increase to 19 units of lantus QHS  - Recommend to increase to 9 units of Admelog TIDQAC  - Recommend moderate Admelog correction scale TIDQAC and QHS  - Please check FSG before meals and QHS, or q6h while NPO  - Inpatient glucose goal 100-180   - Please keep patient on a diabetic, carb controlled diet   - hypoglycemia orderset prn  - Discharge planning: patient said he has white coat syndrome which  makes his glucose erratic. He believes his glucose here are not accurate. He only wants to be discharged on metformin 500 mg BID and follow up with his holisitic doctor outpatient. Declines any adjustment to his diabetes regimen.     #Hypertension  - BP goal <130/80  - Management as per primary team    #HLD  - Goal LDL <70 in the setting of diabetes  - Please check fasting lipid panel if not checked recently     #Obesity  - Consider initiating GLP-1 agonist at discharge. Patient declines at this time.     Will continue to follow.     oNna Hernandez MD  Attending Physician   Department of Endocrinology, Diabetes and Metabolism     If before 9AM or after 5PM, or on weekends/holidays, please call the Endocrine answering service for assistance ([phone]).  For nonurgent matters, please email [email or web address] for assistance.     Please note that a different provider may be following this patient each day.

## 2024-08-08 NOTE — DISCHARGE NOTE PROVIDER - CARE PROVIDER_API CALL
Daniel Gonzalez  Infectious Disease  [address] [place] [id number]  Phone: [phone]  Fax: [phone]  Follow Up Time:

## 2024-08-08 NOTE — DIETITIAN INITIAL EVALUATION ADULT - PERTINENT MEDS FT
MEDICATIONS  (STANDING):  chlorhexidine 2% Cloths 1 Application(s) Topical <User Schedule>  dextrose 5%. 1000 milliLiter(s) (100 mL/Hr) IV Continuous <Continuous>  dextrose 5%. 1000 milliLiter(s) (50 mL/Hr) IV Continuous <Continuous>  dextrose 50% Injectable 25 Gram(s) IV Push once  dextrose 50% Injectable 25 Gram(s) IV Push once  dextrose 50% Injectable 12.5 Gram(s) IV Push once  enoxaparin Injectable 40 milliGRAM(s) SubCutaneous every 24 hours  glucagon  Injectable 1 milliGRAM(s) IntraMuscular once  insulin glargine Injectable (LANTUS) 19 Unit(s) SubCutaneous at bedtime  insulin lispro (ADMELOG) corrective regimen sliding scale   SubCutaneous three times a day before meals  insulin lispro (ADMELOG) corrective regimen sliding scale   SubCutaneous at bedtime  insulin lispro Injectable (ADMELOG) 9 Unit(s) SubCutaneous three times a day before meals  mupirocin 2% Ointment 1 Application(s) Topical <User Schedule>  NIFEdipine XL 60 milliGRAM(s) Oral daily  piperacillin/tazobactam IVPB.. 3.375 Gram(s) IV Intermittent every 8 hours  valsartan 160 milliGRAM(s) Oral daily    MEDICATIONS  (PRN):  dextrose Oral Gel 15 Gram(s) Oral once PRN Blood Glucose LESS THAN 70 milliGRAM(s)/deciliter

## 2024-08-08 NOTE — PATIENT PROFILE ADULT - FALL HARM RISK - HARM RISK INTERVENTIONS

## 2024-08-08 NOTE — DISCHARGE NOTE PROVIDER - HOSPITAL COURSE
HPI:  45 y/o M PMH non insulin dependent type 2 diabetes, peripheral diabetic neuropathy presents w/ R foot injury after stepping on a screw at a junkyard 4 days ago. The patient went to see his outpatient podiatry attending today who sent him to the Emergency Department for right  foot wound draining pus and probing to the bone to r/o osteomyelitis and for IV antibiotics. Of note the patient's last HbA1C was ~ 7 range in March 2024 but then he was taken off all DM meds and HTN meds for unclear reasons and now his latest outpatient HbA1C was 12. The patient expresses unwillingness to start insulin. He was on Trulicity and Jardiance in March.  (06 Aug 2024 12:57)    Hospital Course:  Problem/Plan - 1: Diabetic infection of right foot.   s/p Zosyn in hospital - ID followed with plan for Ancef 2g q8 and Cipro 500BID through 8/20 with weekly labwork.   MRI showing Plantar soft tissue wound tracking to the flexor hallucis tendon sheath with septic tenosynovitis of the flexor hallucis. Proximal extent is not   included in the field-of-view. No evidence of osteomyelitis or septic arthritis  TAMI/PVR performed showing e/o perfusion.   no further intervention at this time per vascular   blood culture gram positive ashu is a procurement contaminant - repeat culture sent and final results pending    Problem/Plan 2  Type 2 diabetes mellitus with hyperglycemia.   ·  Plan: HbA1C 11.5   finger sticks with short acting insulin sliding scale and lantus while admitted  Refusing insulin on discharge, wants to follow up with his holistic medicine team as outpatient.  endo recommendations appreciated.  New diabetic supples sent to [facility].     Problem/Plan - 3:  ·  Problem: Uncontrolled hypertension.   ·  Plan: continue nifedipine XL and valsartan 160 po qd with hold parameters       Problem/Plan - 4:  ·  Problem: HLD (hyperlipidemia).   ·  Plan: was on atorvastatin in the past  fasting lipid panel noted.    Problem/Plan - 5:  ·  Problem: Increased BMI.   ·  Plan: nutrition evaluation      Important Medication Changes and Reason: Ancef and Cipro through 8/20.     Active or Pending Issues Requiring Follow-up: Follow up with primary care or endocrinology for diabetic management  Podiatry follow up    Advanced Directives:   [ x] Full code  [ ] DNR  [ ] Hospice    Discharge Diagnoses: Diabetic foot ulcer with infection  Type 2 diabetes, uncontrolled  Hypertension  Hyperlipidemia

## 2024-08-08 NOTE — PROGRESS NOTE ADULT - ASSESSMENT
44M presents with right foot wound s/p stepping on a nail on 8/2  -Patient seen and evaluated   -Afebrile, WBC 14.03  - Right foot wound sub first metatarsal head wound periosteum with no drainage, no pus, no tracking or tunneling, no soft tissue crepitus or fluctuance, no malodor, sub 2nd metatarsal hyperkeratosis with no underlying wound. Left foot no open wounds or signs of infection  - Right foot X-Ray: no FB, no OM, no gas  - R foot MR: no OM  - Right foot wound culture Stsph aureus, klebsiella pneumoniae, enterobacter cloacae (prelim)  - ID recs, appreciated   - Vasc recs, appreciated   - Pod plan: local wound care with IV abx only  - No podiatric surgical intervention   - Follow up and wound care recs listed in discharge provider note   - Discussed with attending

## 2024-08-08 NOTE — DIETITIAN INITIAL EVALUATION ADULT - REASON INDICATOR FOR ASSESSMENT
Nutrition Consult for Assessment / Education.   Source: Pt, Electronic Medical Record.   Chart reviewed, events noted.

## 2024-08-08 NOTE — PROGRESS NOTE ADULT - ASSESSMENT
ASSESSMENT: 43yo M with Hx HTN, HLD, DM (c/b diabetic neuropathy) and prior who p/w R foot wound after stepping on a screw 4 days ago with purulence from wound probing to bone, admitted for IV abx for osteomyelitis and uncontrolled hypertension. Vascular Surgery consulted for evaluation for possible revascularization.     PLAN:   - TAMI/PVRs demonstrating adequate blood flow to the lower extremities- no vascular intervention planned  - Podiatry planning local wound care vs. partial 1st ray resection, ok to proceed  - Will sign off at this time, please call back with further questions    Vascular Surgery  [id number]

## 2024-08-08 NOTE — DISCHARGE NOTE PROVIDER - NSDCFUADDAPPT_GEN_ALL_CORE_FT
Podiatry Discharge Instructions:  Follow up: Please follow up with Dr. Shetty within 1 week of discharge from the hospital, please call [phone] for appointment and discuss that you recently were seen in the hospital.  Wound Care: Please apply mupirocin, 4x4 gauze and abran daily to right foot wound  Weight bearing: Please weight bear as tolerated in a surgical shoe to right heel.  Antibiotics: Please continue as instructed.

## 2024-08-08 NOTE — PROGRESS NOTE ADULT - ASSESSMENT
44 year old with   DM for 20 years  not on insulin.  Hx of metatarsal resection 2 years ago over 4  th toe.  Neuropathy   good pulses   5  days ago stepped on a s crew that felt through his clogs and punctured the area under his first toe  Denies fever chills or pain but has mild cellulitis  over the dorsum of the foot and swelling at puncture site.     At risk risk for Pseudomonas infection after puncture    zosyn      MRSA nasal screen negative      cultures show mssa, enterobacter , Klebsiella  await cultures  MRI reviewed no Om but significant  inflammation in tnedon shealth    Discussed with pt and wife in detail  await final sensitivities  We may need to use home IV ab                    44 year old with   DM for 20 years  not on insulin.  Hx of metatarsal resection 2 years ago over 4  th toe.  Neuropathy   good pulses   5  days ago stepped on a s crew that felt through his clogs and punctured the area under his first toe  Denies fever chills or pain but has mild cellulitis  over the dorsum of the foot and swelling at puncture site.     At risk risk for Pseudomonas infection after puncture    zosyn      MRSA nasal screen negative      cultures show mssa, enterobacter , Klebsiella  await cultures  MRI reviewed no Om but significant  inflammation in tendon shealth    Discussed with pt and wife in detail  cleared for mid line  BC is not significant   await final sensitivities  We may need to use home IV ab

## 2024-08-08 NOTE — DIETITIAN INITIAL EVALUATION ADULT - OTHER INFO
- UBW: 265-270 pounds per pt; denies any weight changes.   - Dosing wt: 270 pounds (8/06)  - No new wts to assess. No recent wt hx available per Vangie BOLAÑOS.   - RD to continue to monitor weight trends as able.   - Nutritionally Pertinent Meds in-house: Abx, IVF.   - Endo:  	- T2DM; regimen PTA: Metformin 2x/day per pt. Per Endo note: pt was on Trulicity & Jardiance until March when regimen was d/c for unknown reason.  	- A1c 11.5% (8/07) - indicates very poor glycemic control. Endo team following.   	- In-house regimen: Lantus, Admelog, SSI.

## 2024-08-08 NOTE — DIETITIAN INITIAL EVALUATION ADULT - PROBLEM SELECTOR PLAN 1
POSTOPERATIVE VISIT NOTE    PRESENTING COMPLAINT:  Postoperative visit, status post bilateral breast reconstruction with expanders for right-sided breast cancer after undergoing bilateral nipple-non-sparing mastectomies on 04/24/2023.    HISTORY OF PRESENTING COMPLAINT:  Ms. Mckeon is 43 years old.  She is a couple months out from her surgery.  I saw her last week for nonspecific erythema throughout her body.  She was started on Bactrim.  She is feeling better, but continues to have erythema throughout her body, which is improving slowly.  No fevers, no drainage from her wounds.  Overall doing well.    PHYSICAL EXAMINATION:  Vital signs are stable.  She is afebrile, in no obvious distress. Nonspecific redness throughout the torso.  Healed breasts.    ASSESSMENT AND PLAN:  Based upon the above findings, a diagnosis of bilateral breast reconstruction was made.  All her labs from last week were normal.  The plan is to finish the course of antibiotics, start expansion, and then I will see her back thereafter to plan the next stages of reconstruction.  She was happy with the visit.  All exam and discussion done in the presence of my nurse, Viji Rosario.      
with associated sepsis (WBC > 12 and HR > 90 with clear evident source)  ID consultation appreciated   start piperacillin/tazobactam   hold vancomycin per ID for now  swab from pus sent to [facility]  blood cultures testing

## 2024-08-08 NOTE — DISCHARGE NOTE PROVIDER - NSDCCPCAREPLAN_GEN_ALL_CORE_FT
PRINCIPAL DISCHARGE DIAGNOSIS  Diagnosis: Cellulitis of right foot  Assessment and Plan of Treatment: You were treated for a an ulcer of your right foot that is due to diabetes and became infected. You need to complete Ancef and Cipro through 8/20/24 ad you will have blood work completed once a week and sent to Dr. Gonzalez for review. Please continue wound care as directed by the podiatrist below.      SECONDARY DISCHARGE DIAGNOSES  Diagnosis: Uncontrolled hypertension  Assessment and Plan of Treatment: You were started on blood pressure medication in the hospital for poorly controlled blood pressure and should continue at home.    Diagnosis: Type 2 diabetes mellitus with hyperglycemia  Assessment and Plan of Treatment: It was recommended that you take insulin when discharged the hospital but you requested to continue your metformin with plan tofollow up with your doctors. Please monitor your blood sugar as elevated blood sugars increase the risk of infection and will reduce wound healing.

## 2024-08-08 NOTE — PROGRESS NOTE ADULT - ASSESSMENT
Chief Complaint   Patient presents with    Routine Prenatal Visit       HPI: Mark is a  currently at 33w3d who today reports the following:  Contractions - No; Leaking - No; Vaginal bleeding -  No; Swelling of extremities - No.  Having constant pain in the lower quadrants that seems to get worse by the end of the day.    ROS:  GI: Nausea - No; Constipation - No; Diarrhea - No    Neuro: Headache - No; Visual change - No    Respiratory: Cough - No; SOB - No; fever - No     EXAM:  Vitals: See prenatal flowsheet   Abdomen: See prenatal flowsheet   Urine glucose/protein: See prenatal flowsheet   Pelvic: See prenatal flowsheet   MDM:   Impression: Supervision of high risk pregnancy  Anemia in pregnancy  Round ligament pain   Tests done today: HgB   Topics discussed: Continue with PNV's and prescription iron  Prenatal labs reviewed  Pediatrician selection   Tests scheduled today for her next visit:   none       43 y/o M PMH non insulin dependent type 2 diabetes, peripheral diabetic neuropathy presents w/ R foot injury after stepping on a screw now clinical presentation consistent with diabetic right foot infection with associated sepsis and uncontrolled HTN

## 2024-08-08 NOTE — DIETITIAN INITIAL EVALUATION ADULT - ADD RECOMMEND
1) Continue Consistent Carbohydrate diet as tolerated.  2) Recommend Multivitamin daily pending no medical contraindications.  3) Continue to monitor PO intake, weight, labs, skin, GI status, and diet.

## 2024-08-08 NOTE — DISCHARGE NOTE PROVIDER - NSDCMRMEDTOKEN_GEN_ALL_CORE_FT
metFORMIN 500 mg oral tablet: 1 tab(s) orally 2 times a month   alcohol swabs: Apply topically to affected area 4 times a day  ceFAZolin 2 g intravenous injection: 2 gram(s) intravenously every 8 hours  Cipro 500 mg oral tablet: 1 tab(s) orally 2 times a day  glucometer (per patient&#x27;s insurance): Test blood sugars four times a day. Dispense #1 glucometer.  Glucometer Test Strips: Test blood sugar four times a day  lancets: 1 application subcutaneously 4 times a day  metFORMIN 500 mg oral tablet: 1 tab(s) orally 2 times a month  mupirocin 2% topical ointment: Apply topically to affected area once a day 1 Apply topically to affected area  NIFEdipine 60 mg oral tablet, extended release: 1 tab(s) orally once a day  valsartan 160 mg oral tablet: 1 tab(s) orally once a day

## 2024-08-08 NOTE — DIETITIAN INITIAL EVALUATION ADULT - ORAL INTAKE PTA/DIET HISTORY
Pt reports having a good appetite and PO intake PTA. Follows regular diet; limits concentrated sweets for DM. Pt denies any known food allergies or intolerances; denies any micronutrient supplementation at home. Denies any difficulty chewing/swallowing at this time.

## 2024-08-08 NOTE — DIETITIAN INITIAL EVALUATION ADULT - NUTRITIONGOAL OUTCOME1
Pt's BG levels will trend toward goal in-house; Pt will be able to provide 2 teach-back points upon follow up.

## 2024-08-08 NOTE — DIETITIAN INITIAL EVALUATION ADULT - PROBLEM SELECTOR PLAN 5
INTERVAL HPI/OVERNIGHT EVENTS:b MRSA negative . Dc Vanco. NGT coiled in back ; required reposition and CXR ; confirmed placement.     Patient is a 80y old  Male who presents with a chief complaint of R ICH (2022 11:11)      ROS:      Allergies    No Known Allergies    Intolerances        ANTIBIOTICS/RELEVANT:  antimicrobials  cefepime   IVPB 1000 milliGRAM(s) IV Intermittent every 8 hours    immunologic:    OTHER:  albuterol/ipratropium for Nebulization 3 milliLiter(s) Nebulizer every 6 hours  enoxaparin Injectable 40 milliGRAM(s) SubCutaneous every 12 hours  insulin lispro (ADMELOG) corrective regimen sliding scale   SubCutaneous every 6 hours  lacosamide Solution 100 milliGRAM(s) Oral two times a day  nystatin Cream 1 Application(s) Topical every 12 hours  senna 2 Tablet(s) Oral at bedtime  sodium chloride 0.9% for Nebulization 3 milliLiter(s) Nebulizer every 6 hours      Objective:  Vital Signs Last 24 Hrs  T(C): 36.9 (2022 04:00), Max: 37.3 (2022 20:00)  T(F): 98.4 (2022 04:00), Max: 99.1 (2022 20:00)  HR: 93 (2022 04:00) (61 - 103)  BP: 131/82 (2022 04:00) (78/53 - 154/72)  BP(mean): 102 (2022 04:00) (62 - 111)  RR: 24 (2022 04:00) (9 - 31)  SpO2: 97% (2022 04:00) (90% - 100%)    PHYSICAL EXAM:      LABS:                        11.6   7.92  )-----------( 235      ( 2022 00:38 )             38.0         148<H>  |  106  |  49<H>  ----------------------------<  195<H>  4.0   |  33<H>  |  0.99    Ca    10.4      2022 00:38  Phos  2.0       Mg     2.7         TPro  7.3  /  Alb  3.3  /  TBili  0.5  /  DBili  x   /  AST  57<H>  /  ALT  31  /  AlkPhos  73      PT/INR - ( 2022 00:38 )   PT: 13.7 sec;   INR: 1.15 ratio         PTT - ( 2022 00:38 )  PTT:32.6 sec  Urinalysis Basic - ( 2022 20:53 )    Color: Yellow / Appearance: Clear / S.029 / pH: x  Gluc: x / Ketone: Trace  / Bili: Negative / Urobili: Negative   Blood: x / Protein: 30 mg/dL / Nitrite: Negative   Leuk Esterase: Negative / RBC: 1 /hpf / WBC 1 /HPF   Sq Epi: x / Non Sq Epi: 1 / Bacteria: 0.0          MICROBIOLOGY:  Culture - Sputum . (22 @ 13:58)    Gram Stain:   Moderate polymorphonuclear leukocytes per low power field  Rare Squamous epithelial cells per low power field  No organisms seen per oil power field    Specimen Source: .Sputum Sputum    Culture - Blood (22 @ 13:53)    Specimen Source: .Blood Blood-Venous    Culture Results:   No growth to date.    Culture - Blood (22 @ 13:53)    Specimen Source: .Blood Blood-Peripheral    Culture Results:   No growth to date.      RADIOLOGY & ADDITIONAL STUDIES:    < from: Xray Chest 1 View- PORTABLE-Urgent (Xray Chest 1 View- PORTABLE-Urgent .) (22 @ 15:37) >  PROCEDURE DATE:  2022          INTERPRETATION:  HISTORY: Nasogastric tube placement    TECHNIQUE: A single AP view of the chest was obtained.    COMPARISON: 2022    FINDINGS: The heart size cannot be adequately assessed on this single   view. There is mild elevation of the right hemidiaphragm. There is a   nasogastric tube with its tip in good position within the stomach. No   focal consolidation or pleural effusion is seen.    IMPRESSION: Nasogastric tube in good position.      < from: CT Head No Cont (22 @ 09:57) >  PROCEDURE DATE:  2022          INTERPRETATION:  Clinical indication: Altered mental status    Multiple axial sections were performed from base skull to vertex without   contrast enhancement. Coronal and sagittal destructions were performed as   well    This exam is compared with prior head CT performed on 2022.    Abnormal high attenuation involving the high left frontal cortical   subcortical region is again seen. This finding measures approximately 5.1   x 2.8 cm and previously measured approximately 7.3 x 3.2 cm. Surrounding   edema is again seen.    Small amount of subrecord hemorrhage is again suspected involving the   left posterior temporal/posterior frontal region    Intraventricular hemorrhage is again seen.    The size and configuration of ventricles appear unchanged    No new areas of acute hemorrhage is seen.    Extra-axial low-attenuation from the high left frontal region is again   seen with bony scalloping. This appears unchanged when compared with the   prior exam and is likely compatible with an arachnoid cyst    Evaluation of osseous structures with the appropriate window appears   unchanged    The visualized paranasal sinuses mastoid and middle ear regions appear   clear.    Orotracheal and orogastric tubes are seen.    The paranasal sinuses mastoid and middle ear regions appear clear.    The patient status post bilateral cataract surgery.    IMPRESSION: Acute parenchymal hemorrhage is again seen as well as   intraventricular hemorrhage. Subrecord hemorrhage is suspected.    < end of copied text >     INTERVAL HPI/OVERNIGHT EVENTS:b MRSA negative . Dc Vanco. NGT coiled in back ; required reposition and CXR ; confirmed placement.     Patient is a 80y old  Male who presents with a chief complaint of R ICH (2022 11:11)      80 yr male with PMHx HTN, SWETA on home CPAP, DM2,  Ascending Ao aneurysm 4cm, infrarenal AAA 5 cm s/p AAA repair , Rt common iliac aneurysm 5cm (CT scan at [facility] 2020) meant for vascular intervention however unable due to COVID-19+ two weeks prior to presentation,  who was  transferred to [facility]  NSICU 22 after being found to have Rt frontal parenchymal hemorrhage with mass effect without midline shift found on CTH after presenting originally to [facility] with fatigue, dizziness, s/p fall at home. Pt at [facility] was evaluated by vascular surg, determined no surg intervention at this time, pt evaluated by Neuro surg who offered MIND clinical trial, STX-guided resection vs medical management however family preferred only minimally invasive intervention if necessary. Neuro surg concerned for CAA (amyloid angiopathy), Pt baseline as per documentation pt initially AOx1 with improvement to AOx3 (22). Pt initially placed on antihypertensive therapy, hypertonic saline therapy.     This hospital course c/b sz like activity 22, loaded with keppra and started on vimpat therapy, EEG at that time however without epileptiform activity, development of AMS with hypoxic/hypercapnic resp distress requiring intubation 22, repeat CTH at that time without acute changes, re demonstration of IPH. Pt with eventual improvement in resp status and extubated 22.       Pt  eventually transferred to medicine floor 22. Course however further c/b development of fevers wit Tmax to 38.8 22, received CT chest 22 that demonstrated new bilateral patchy opacities concerning for aspiration pneum, started cefepime therapy, recurrent worsening AMS and hypoxic resp failure with SPO2 of 88-90's requiring re intubation 22 with transfer to MICU for hypoxic resp failure to possible aspiration pneum and septic shock of unknown origin      ADD 1640 22: Discussed with Dr. Swan (Palliative Care), he spoke with family and they wish that patient have intervention that will prolong his life    ROS:  Follows commands .     Allergies    No Known Allergies    Intolerances        ANTIBIOTICS/RELEVANT:  antimicrobials  cefepime   IVPB 1000 milliGRAM(s) IV Intermittent every 8 hours    immunologic:    OTHER:  albuterol/ipratropium for Nebulization 3 milliLiter(s) Nebulizer every 6 hours  enoxaparin Injectable 40 milliGRAM(s) SubCutaneous every 12 hours  insulin lispro (ADMELOG) corrective regimen sliding scale   SubCutaneous every 6 hours  lacosamide Solution 100 milliGRAM(s) Oral two times a day  nystatin Cream 1 Application(s) Topical every 12 hours  senna 2 Tablet(s) Oral at bedtime  sodium chloride 0.9% for Nebulization 3 milliLiter(s) Nebulizer every 6 hours      Objective:  Vital Signs Last 24 Hrs  T(C): 36.9 (2022 04:00), Max: 37.3 (2022 20:00)  T(F): 98.4 (2022 04:00), Max: 99.1 (2022 20:00)  HR: 93 (2022 04:00) (61 - 103)  BP: 131/82 (2022 04:00) (78/53 - 154/72)  BP(mean): 102 (2022 04:00) (62 - 111)  RR: 24 (2022 04:00) (9 - 31)  SpO2: 97% (2022 04:00) (90% - 100%)    PHYSICAL EXAM:  GENERAL:  well-groomed, well-developed    HEAD:    Atraumatic, Normocephalic    EYES:   PERRL 2mm sluggishly reactive, conjunctiva and sclera clear    ENMT:    Moist mucous membranes    NECK:    no JVD    NERVOUS SYSTEM:  Responsive to  verbal stimuli ,follows commands ,  moves upper extremities with purposeful movement  2-3/5 Rt, Lt 1-2/5, lower extremities Rt 2/5 Lt 1/5,   CHEST/LUNG:   on NIPPV .Breath sounds bilaterally,  No crackles, rhonchi, wheezing, or rubs.     HEART:    S1/S2 No murmurs, rubs, or gallops, POCUS    ABDOMEN:   Soft, Nontender, Nondistended; Bowel sounds present, Bladder non distended, non palpable    EXTREMITIES:   Pulses palpable radial pulses 2+ bilat, DP/PT 1+/1+ bilat, without clubbing, cyanosis. Digits warm to touch with good cap refill < 3 secs    SKIN:   warm, dry, intact, normal color, no rash or abnormal lesions, without palpable nodes            LABS:                        11.6   7.92  )-----------( 235      ( 2022 00:38 )             38.0         148<H>  |  106  |  49<H>  ----------------------------<  195<H>  4.0   |  33<H>  |  0.99    Ca    10.4      2022 00:38  Phos  2.0       Mg     2.7         TPro  7.3  /  Alb  3.3  /  TBili  0.5  /  DBili  x   /  AST  57<H>  /  ALT  31  /  AlkPhos  73      PT/INR - ( 2022 00:38 )   PT: 13.7 sec;   INR: 1.15 ratio         PTT - ( 2022 00:38 )  PTT:32.6 sec  Urinalysis Basic - ( 2022 20:53 )    Color: Yellow / Appearance: Clear / S.029 / pH: x  Gluc: x / Ketone: Trace  / Bili: Negative / Urobili: Negative   Blood: x / Protein: 30 mg/dL / Nitrite: Negative   Leuk Esterase: Negative / RBC: 1 /hpf / WBC 1 /HPF   Sq Epi: x / Non Sq Epi: 1 / Bacteria: 0.0          MICROBIOLOGY:  Culture - Sputum . (22 @ 13:58)    Gram Stain:   Moderate polymorphonuclear leukocytes per low power field  Rare Squamous epithelial cells per low power field  No organisms seen per oil power field    Specimen Source: .Sputum Sputum    Culture - Blood (22 @ 13:53)    Specimen Source: .Blood Blood-Venous    Culture Results:   No growth to date.    Culture - Blood (22 @ 13:53)    Specimen Source: .Blood Blood-Peripheral    Culture Results:   No growth to date.      RADIOLOGY & ADDITIONAL STUDIES:    < from: Xray Chest 1 View- PORTABLE-Urgent (Xray Chest 1 View- PORTABLE-Urgent .) (22 @ 15:37) >  PROCEDURE DATE:  2022          INTERPRETATION:  HISTORY: Nasogastric tube placement    TECHNIQUE: A single AP view of the chest was obtained.    COMPARISON: 2022    FINDINGS: The heart size cannot be adequately assessed on this single   view. There is mild elevation of the right hemidiaphragm. There is a   nasogastric tube with its tip in good position within the stomach. No   focal consolidation or pleural effusion is seen.    IMPRESSION: Nasogastric tube in good position.      < from: CT Head No Cont (22 @ 09:57) >  PROCEDURE DATE:  2022          INTERPRETATION:  Clinical indication: Altered mental status    Multiple axial sections were performed from base skull to vertex without   contrast enhancement. Coronal and sagittal destructions were performed as   well    This exam is compared with prior head CT performed on 2022.    Abnormal high attenuation involving the high left frontal cortical   subcortical region is again seen. This finding measures approximately 5.1   x 2.8 cm and previously measured approximately 7.3 x 3.2 cm. Surrounding   edema is again seen.    Small amount of subrecord hemorrhage is again suspected involving the   left posterior temporal/posterior frontal region    Intraventricular hemorrhage is again seen.    The size and configuration of ventricles appear unchanged    No new areas of acute hemorrhage is seen.    Extra-axial low-attenuation from the high left frontal region is again   seen with bony scalloping. This appears unchanged when compared with the   prior exam and is likely compatible with an arachnoid cyst    Evaluation of osseous structures with the appropriate window appears   unchanged    The visualized paranasal sinuses mastoid and middle ear regions appear   clear.    Orotracheal and orogastric tubes are seen.    The paranasal sinuses mastoid and middle ear regions appear clear.    The patient status post bilateral cataract surgery.    IMPRESSION: Acute parenchymal hemorrhage is again seen as well as   intraventricular hemorrhage. Subrecord hemorrhage is suspected.    < end of copied text >     nutrition evaluation  enoxaparin

## 2024-08-08 NOTE — DIETITIAN INITIAL EVALUATION ADULT - OTHER CALCULATIONS
per dr curiel please give 100meq bicarb push and start sodium acetate gtt 3amps in d5w at 
100ml/hour. no vent changes Fluid needs deferred to team.  Estimated needs using IBW in setting of BMI >35.

## 2024-08-08 NOTE — DIETITIAN INITIAL EVALUATION ADULT - NSFNSGIIOFT_GEN_A_CORE
Pt denies any current N/V/D/C. Per chart, no BM documented x admission. No current bowel regimen in place.

## 2024-08-08 NOTE — DISCHARGE NOTE PROVIDER - NSRESEARCHGRANT_HIDDEN_GEN_A_CORE
Unable to reach patient after 2 attempts.  Left voice message at phone number given (# [phone]).    Reason for Disposition  • Message left on unidentified voice mail.  Phone number verified.    Protocols used: NO CONTACT OR DUPLICATE CONTACT CALL-A-       Yes

## 2024-08-08 NOTE — DIETITIAN INITIAL EVALUATION ADULT - PERSON TAUGHT/METHOD
Provided education on Nutrition Therapy for Type 2 Diabetes. Emphasis on what foods contain carbohydrates, importance of pairing carbohydrates with protein for glycemic control; choosing whole grains vs refined carbohydrates; limiting refined sugars, portion sizes. Good comprehension noted. Pt made aware RD to remain available for any questions./verbal instruction/written material/patient instructed

## 2024-08-09 ENCOUNTER — TRANSCRIPTION ENCOUNTER (OUTPATIENT)
Age: 44
End: 2024-08-09

## 2024-08-09 VITALS
TEMPERATURE: 99 F | SYSTOLIC BLOOD PRESSURE: 145 MMHG | DIASTOLIC BLOOD PRESSURE: 97 MMHG | HEART RATE: 107 BPM | OXYGEN SATURATION: 98 % | RESPIRATION RATE: 18 BRPM

## 2024-08-09 LAB
GLUCOSE BLDC GLUCOMTR-MCNC: 186 MG/DL — HIGH (ref 70–99)
GLUCOSE BLDC GLUCOMTR-MCNC: 234 MG/DL — HIGH (ref 70–99)
GLUCOSE BLDC GLUCOMTR-MCNC: 247 MG/DL — HIGH (ref 70–99)
HCT VFR BLD CALC: 40 % — SIGNIFICANT CHANGE UP (ref 39–50)
HGB BLD-MCNC: 13.8 G/DL — SIGNIFICANT CHANGE UP (ref 13–17)
MCHC RBC-ENTMCNC: 30.9 PG — SIGNIFICANT CHANGE UP (ref 27–34)
MCHC RBC-ENTMCNC: 34.5 GM/DL — SIGNIFICANT CHANGE UP (ref 32–36)
MCV RBC AUTO: 89.5 FL — SIGNIFICANT CHANGE UP (ref 80–100)
NRBC # BLD: 0 /100 WBCS — SIGNIFICANT CHANGE UP (ref 0–0)
PLATELET # BLD AUTO: 335 K/UL — SIGNIFICANT CHANGE UP (ref 150–400)
RBC # BLD: 4.47 M/UL — SIGNIFICANT CHANGE UP (ref 4.2–5.8)
RBC # FLD: 11.8 % — SIGNIFICANT CHANGE UP (ref 10.3–14.5)
WBC # BLD: 12.57 K/UL — HIGH (ref 3.8–10.5)
WBC # FLD AUTO: 12.57 K/UL — HIGH (ref 3.8–10.5)

## 2024-08-09 PROCEDURE — 82803 BLOOD GASES ANY COMBINATION: CPT

## 2024-08-09 PROCEDURE — 85027 COMPLETE CBC AUTOMATED: CPT

## 2024-08-09 PROCEDURE — 84132 ASSAY OF SERUM POTASSIUM: CPT

## 2024-08-09 PROCEDURE — 90715 TDAP VACCINE 7 YRS/> IM: CPT

## 2024-08-09 PROCEDURE — 83036 HEMOGLOBIN GLYCOSYLATED A1C: CPT

## 2024-08-09 PROCEDURE — A9585: CPT

## 2024-08-09 PROCEDURE — 99285 EMERGENCY DEPT VISIT HI MDM: CPT

## 2024-08-09 PROCEDURE — 82947 ASSAY GLUCOSE BLOOD QUANT: CPT

## 2024-08-09 PROCEDURE — 81001 URINALYSIS AUTO W/SCOPE: CPT

## 2024-08-09 PROCEDURE — 87070 CULTURE OTHR SPECIMN AEROBIC: CPT

## 2024-08-09 PROCEDURE — 83605 ASSAY OF LACTIC ACID: CPT

## 2024-08-09 PROCEDURE — 84295 ASSAY OF SERUM SODIUM: CPT

## 2024-08-09 PROCEDURE — 93923 UPR/LXTR ART STDY 3+ LVLS: CPT

## 2024-08-09 PROCEDURE — 96365 THER/PROPH/DIAG IV INF INIT: CPT

## 2024-08-09 PROCEDURE — 73630 X-RAY EXAM OF FOOT: CPT

## 2024-08-09 PROCEDURE — 87040 BLOOD CULTURE FOR BACTERIA: CPT

## 2024-08-09 PROCEDURE — 85018 HEMOGLOBIN: CPT

## 2024-08-09 PROCEDURE — 87075 CULTR BACTERIA EXCEPT BLOOD: CPT

## 2024-08-09 PROCEDURE — 87150 DNA/RNA AMPLIFIED PROBE: CPT

## 2024-08-09 PROCEDURE — 80048 BASIC METABOLIC PNL TOTAL CA: CPT

## 2024-08-09 PROCEDURE — 80053 COMPREHEN METABOLIC PANEL: CPT

## 2024-08-09 PROCEDURE — 82607 VITAMIN B-12: CPT

## 2024-08-09 PROCEDURE — 82330 ASSAY OF CALCIUM: CPT

## 2024-08-09 PROCEDURE — 82435 ASSAY OF BLOOD CHLORIDE: CPT

## 2024-08-09 PROCEDURE — C1894: CPT

## 2024-08-09 PROCEDURE — 85014 HEMATOCRIT: CPT

## 2024-08-09 PROCEDURE — 90471 IMMUNIZATION ADMIN: CPT

## 2024-08-09 PROCEDURE — 96367 TX/PROPH/DG ADDL SEQ IV INF: CPT

## 2024-08-09 PROCEDURE — 87641 MR-STAPH DNA AMP PROBE: CPT

## 2024-08-09 PROCEDURE — 82962 GLUCOSE BLOOD TEST: CPT

## 2024-08-09 PROCEDURE — 85025 COMPLETE CBC W/AUTO DIFF WBC: CPT

## 2024-08-09 PROCEDURE — 86140 C-REACTIVE PROTEIN: CPT

## 2024-08-09 PROCEDURE — 87640 STAPH A DNA AMP PROBE: CPT

## 2024-08-09 PROCEDURE — 87077 CULTURE AEROBIC IDENTIFY: CPT

## 2024-08-09 PROCEDURE — 73720 MRI LWR EXTREMITY W/O&W/DYE: CPT | Mod: MC

## 2024-08-09 PROCEDURE — 87186 SC STD MICRODIL/AGAR DIL: CPT

## 2024-08-09 PROCEDURE — 80061 LIPID PANEL: CPT

## 2024-08-09 PROCEDURE — 84443 ASSAY THYROID STIM HORMONE: CPT

## 2024-08-09 PROCEDURE — C1751: CPT

## 2024-08-09 PROCEDURE — 82746 ASSAY OF FOLIC ACID SERUM: CPT

## 2024-08-09 PROCEDURE — 85652 RBC SED RATE AUTOMATED: CPT

## 2024-08-09 PROCEDURE — 99233 SBSQ HOSP IP/OBS HIGH 50: CPT

## 2024-08-09 PROCEDURE — 36569 INSJ PICC 5 YR+ W/O IMAGING: CPT

## 2024-08-09 PROCEDURE — 36415 COLL VENOUS BLD VENIPUNCTURE: CPT

## 2024-08-09 RX ORDER — BENZOCAINE .06; .7 ML/1; ML/1
0 SWAB TOPICAL
Qty: 100 | Refills: 1
Start: 2024-08-09

## 2024-08-09 RX ORDER — NIFEDIPINE 20 MG/1
1 CAPSULE, LIQUID FILLED ORAL
Qty: 30 | Refills: 3
Start: 2024-08-09 | End: 2024-12-06

## 2024-08-09 RX ORDER — CEFAZOLIN SODIUM 10 G
2 VIAL (EA) INJECTION
Qty: 66 | Refills: 0
Start: 2024-08-09 | End: 2024-08-19

## 2024-08-09 RX ORDER — MUPIROCIN CALCIUM 20 MG/G
1 CREAM TOPICAL
Qty: 1 | Refills: 0
Start: 2024-08-09

## 2024-08-09 RX ORDER — VALSARTAN 40 MG/1
1 TABLET, FILM COATED ORAL
Qty: 30 | Refills: 0
Start: 2024-08-09 | End: 2024-09-07

## 2024-08-09 RX ORDER — CIPROFLOXACIN 500 MG/1
1 TABLET, FILM COATED ORAL
Qty: 22 | Refills: 0
Start: 2024-08-09 | End: 2024-08-19

## 2024-08-09 RX ORDER — BACTERIOSTATIC SODIUM CHLORIDE 0.9 %
0 VIAL (ML) INJECTION
Qty: 1 | Refills: 0
Start: 2024-08-09

## 2024-08-09 RX ORDER — NIFEDIPINE 20 MG/1
1 CAPSULE, LIQUID FILLED ORAL
Qty: 30 | Refills: 0
Start: 2024-08-09 | End: 2024-09-07

## 2024-08-09 RX ORDER — VALSARTAN 40 MG/1
1 TABLET, FILM COATED ORAL
Qty: 30 | Refills: 3
Start: 2024-08-09 | End: 2024-12-06

## 2024-08-09 RX ADMIN — PIPERACILLIN SODIUM, TAZOBACTAM SODIUM 25 GRAM(S): 3; .375 INJECTION, POWDER, LYOPHILIZED, FOR SOLUTION INTRAVENOUS at 05:15

## 2024-08-09 RX ADMIN — CHLORHEXIDINE GLUCONATE 1 APPLICATION(S): 500 CLOTH TOPICAL at 05:20

## 2024-08-09 RX ADMIN — Medication 1: at 08:46

## 2024-08-09 RX ADMIN — Medication 9 UNIT(S): at 17:38

## 2024-08-09 RX ADMIN — Medication 2: at 12:02

## 2024-08-09 RX ADMIN — VALSARTAN 160 MILLIGRAM(S): 40 TABLET, FILM COATED ORAL at 05:15

## 2024-08-09 RX ADMIN — ENOXAPARIN SODIUM 40 MILLIGRAM(S): 120 INJECTION SUBCUTANEOUS at 05:15

## 2024-08-09 RX ADMIN — PIPERACILLIN SODIUM, TAZOBACTAM SODIUM 25 GRAM(S): 3; .375 INJECTION, POWDER, LYOPHILIZED, FOR SOLUTION INTRAVENOUS at 13:43

## 2024-08-09 RX ADMIN — Medication 2: at 17:37

## 2024-08-09 RX ADMIN — NIFEDIPINE 60 MILLIGRAM(S): 20 CAPSULE, LIQUID FILLED ORAL at 05:15

## 2024-08-09 NOTE — PROGRESS NOTE ADULT - PROBLEM SELECTOR PLAN 2
extensive counseling provided re need for compliance with endo recommendations  patient promises to follow up with endo after discharge  all equipment and Lantus prescripts provided  wife at bedside understands importance as well
HbA1C testing   finger sticks with short acting insulin sliding scale  no oral meds  diabetic diet  monitor for hypoglycemia   endo recommendations appreciated
HbA1C 11.5   finger sticks with short acting insulin sliding scale  no oral meds  diabetic diet  monitor for hypoglycemia   endo recommendations appreciated

## 2024-08-09 NOTE — PROGRESS NOTE ADULT - PROBLEM SELECTOR PLAN 5
nutrition evaluation  enoxaparin
nutrition evaluation appreciated   enoxaparin while inpatient
nutrition evaluation  enoxaparin

## 2024-08-09 NOTE — ADVANCED PRACTICE NURSE CONSULT - ASSESSMENT
Midline Catheter Insertion Note    Catheter type: 4F  : Bard  Power injectable: Yes  Lot# [id number]                                                                                                                                                                                                         Procedure assisted by: IMER Tobin RN  Time out was preformed, confirming the patient's first and last name, date of birth, procedure, and correct site prior to state of procedure.    Patient was placed with HOB 30 degrees. Patient placement site was prepped with chlorhexidine solution, then draped using maximum sterile barrier protection. The area was injected with 2cc of 1% Lidocaine. Using the Bard Site Rite 8, the catheter was placed using the Modified Seldinger Technique. Strict adherence to outline aseptic technique including handwashing, glove and gown, utilizing mask and cap, plus draping the patient with a sterile drape was observed. Upon completion of line placement, the insertion site was covered with a sterile occlusive CHG dressing. Pt tolerated procedure well.     All materials used for catheter insertion, including the intact guide wires, were accounted for at the end of the procedure.  Number of attempts: 1  Complications/Comments: None    Emergency Placement: No  Site: New  Anatomical Site of insertion: Left Brachial  Catheter size/length: 4F, 20cm  US guided Bard single lumen power midline placed

## 2024-08-09 NOTE — PROGRESS NOTE ADULT - NSPROGADDITIONALINFOA_GEN_ALL_CORE
k
discussed with patient in detail, expresses understanding of treatment plans.  all questions answered
discussed with patient in detail, expresses understanding of treatment plans.  discussed with patient's wife at bedside in detail  discussed with covering ACP   discussed with ID attending
discussed with patient in detail, expresses understanding of treatment plans.  discussed with patient's wife at bedside in detail

## 2024-08-09 NOTE — PROGRESS NOTE ADULT - ASSESSMENT
44M presents with right foot wound s/p stepping on a nail on 8/2  -Patient seen and evaluated   -Afebrile, WBC 12.57  - Right foot wound sub first metatarsal head wound periosteum with no drainage, no pus, no tracking or tunneling, no soft tissue crepitus or fluctuance, no malodor, sub 2nd metatarsal hyperkeratosis with no underlying wound. Left foot no open wounds or signs of infection  - Right foot X-Ray: no FB, no OM, no gas  - R foot MR: no OM  - Right foot wound culture Stsph aureus, klebsiella pneumoniae, enterobacter cloacae (prelim)  - ID recs, appreciated   - Vasc recs, appreciated   - Pod plan: local wound care with IV abx only  - No podiatric surgical intervention   - Follow up and wound care recs listed in discharge provider note   - Discussed with attending

## 2024-08-09 NOTE — PROGRESS NOTE ADULT - PROBLEM SELECTOR PLAN 1
piperacillin/tazobactam day 5 today   changed to cefazolin and po Cipro per ID attending   s/p midline placement for antibiotics IV thru 8/20
with associated sepsis (WBC > 12 and HR > 90 with clear evident source)  continue piperacillin/tazobactam day 2 today  MRSA PCR negative   blood cultures negative so far  tissue cultures testing   MRI tomorrow   TAMI/PVR ordered
continue piperacillin/tazobactam day 4 today  MRI noted   TAMI/PVR noted  no further intervention at this time per vascular   discussed with ID attending  blood culture gram positive ashu is a procurement contaminant  cleared for midline placement for antibiotics IV x 2 more weeks  awaiting sensitivity of organism
Former smoker

## 2024-08-09 NOTE — PROGRESS NOTE ADULT - PROBLEM SELECTOR PLAN 4
was on atorvastatin in the past  fasting lipid panel noted
fasting lipid panel noted  repeat as outpatient for more accurate measurement  follow up endo for possible statin as outpatient
was on atorvastatin in the past  fasting lipid panel noted

## 2024-08-09 NOTE — ADVANCED PRACTICE NURSE CONSULT - REASON FOR CONSULT
Vascular Access Team    Evaluation for: Bedside Midline placement  Requested by name:  Kalia Ram  Date/Time: 8/8 @15:48    Indication: 2 weeks IV antibiotics  Allergy to CHG or Heparin or Lidocaine: NKDA     Platelets(>20): 335  INR(<3): NA  eGFR(>40): 110  Blood cultures sent: yes  Blood culture negative in 48hrs: positive cleared by MD Gonzalez  Anticoagulants: Lovenox 40mg  Arms DVT: no  Mastectomy: no  Fistula: no  PPM/Defib: NA  IR or Nephrology or ID clearance needed: no    Plan: Bedside midline order evaluated.

## 2024-08-09 NOTE — PROGRESS NOTE ADULT - REASON FOR ADMISSION
diabetic foot ulcer

## 2024-08-09 NOTE — DISCHARGE NOTE NURSING/CASE MANAGEMENT/SOCIAL WORK - PATIENT PORTAL LINK FT
You can access the FollowMyHealth Patient Portal offered by [facility] by registering at the following website: [email or web address]. By joining [employer]’s FollowMyHealth portal, you will also be able to view your health information using other applications (apps) compatible with our system.

## 2024-08-09 NOTE — DISCHARGE NOTE NURSING/CASE MANAGEMENT/SOCIAL WORK - NSDCVIVACCINE_GEN_ALL_CORE_FT
Tdap; 06-Aug-2024 10:46; Rayne Castañeda (RN); [employer]; [id number] (Exp. Date: 28-Feb-2026); IntraMuscular; Deltoid Right.; 0.5 milliLiter(s); VIS (VIS Published: 09-May-2013, VIS Presented: 06-Aug-2024);

## 2024-08-09 NOTE — PROGRESS NOTE ADULT - ASSESSMENT
44 year old with   DM for 20 years  not on insulin.  Hx of metatarsal resection 2 years ago over 4  th toe.  Neuropathy   good pulses   5  days ago stepped on a s crew that felt through his clogs and punctured the area under his first toe  Denies fever chills or pain but has mild cellulitis  over the dorsum of the foot and swelling at puncture site.     At risk risk for Pseudomonas infection after puncture    zosyn      MRSA nasal screen negative      cultures show mssa, enterobacter , Klebsiella     MRI reviewed no Om but significant  inflammation in tendon shealth    Discussed with pt and wife in detail  cleared for mid line  BC is not significant              discharge on ancef 2 q 8 hr and po cipro 500 mg bid  until 8/20  cbc and cmp q week

## 2024-08-09 NOTE — PROGRESS NOTE ADULT - PROVIDER SPECIALTY LIST ADULT
Infectious Disease
Infectious Disease
Podiatry
Infectious Disease
Podiatry
Endocrinology
Vascular Surgery
Vascular Surgery
Endocrinology
Podiatry
Internal Medicine

## 2024-08-09 NOTE — DISCHARGE NOTE NURSING/CASE MANAGEMENT/SOCIAL WORK - NSDCFUADDAPPT_GEN_ALL_CORE_FT
Podiatry Discharge Instructions:  Follow up: Please follow up with Dr. Shetty within 1 week of discharge from the hospital, please call [phone] for appointment and discuss that you recently were seen in the hospital.  Wound Care: Please apply mupirocin, 4x4 gauze and abran daily to right foot wound  Weight bearing: Please weight bear as tolerated in a surgical shoe to right heel.  Antibiotics: Please continue as instructed.

## 2024-08-09 NOTE — PROGRESS NOTE ADULT - PROBLEM SELECTOR PLAN 3
continue nifedipine XL and valsartan 160 po qd with hold parameters   improved but not optimal yet   continue to monitor
continue nifedipine XL and valsartan 160 po qd on discharge   continue to monitor as op
continue nifedipine XL and valsartan 160 po qd with hold parameters   improved but not optimal yet   continue to monitor

## 2024-08-09 NOTE — PROGRESS NOTE ADULT - SUBJECTIVE AND OBJECTIVE BOX
Patient is a 44y old  Male who presents with a chief complaint of diabetic foot ulcer (08 Aug 2024 15:37)       INTERVAL HPI/OVERNIGHT EVENTS:  Patient seen and evaluated at bedside.  Pt is resting comfortable in NAD. Denies N/V/F/C.    Allergies    No Known Allergies    Intolerances        Vital Signs Last 24 Hrs  T(C): 37.1 (09 Aug 2024 04:37), Max: 37.2 (08 Aug 2024 21:45)  T(F): 98.8 (09 Aug 2024 04:37), Max: 98.9 (08 Aug 2024 21:45)  HR: 90 (09 Aug 2024 04:37) (88 - 104)  BP: 163/93 (09 Aug 2024 04:37) (145/93 - 163/93)  BP(mean): --  RR: 18 (09 Aug 2024 04:37) (18 - 18)  SpO2: 99% (09 Aug 2024 04:37) (98% - 99%)    Parameters below as of 09 Aug 2024 04:37  Patient On (Oxygen Delivery Method): room air        LABS:                        13.8   12.57 )-----------( 335      ( 09 Aug 2024 07:07 )             40.0     08-08    136  |  96  |  10  ----------------------------<  241<H>  3.5   |  26  |  0.85    Ca    9.4      08 Aug 2024 07:28        Urinalysis Basic - ( 08 Aug 2024 07:28 )    Color: x / Appearance: x / SG: x / pH: x  Gluc: 241 mg/dL / Ketone: x  / Bili: x / Urobili: x   Blood: x / Protein: x / Nitrite: x   Leuk Esterase: x / RBC: x / WBC x   Sq Epi: x / Non Sq Epi: x / Bacteria: x      CAPILLARY BLOOD GLUCOSE      POCT Blood Glucose.: 186 mg/dL (09 Aug 2024 08:26)  POCT Blood Glucose.: 170 mg/dL (08 Aug 2024 20:52)  POCT Blood Glucose.: 290 mg/dL (08 Aug 2024 17:38)  POCT Blood Glucose.: 252 mg/dL (08 Aug 2024 12:48)  POCT Blood Glucose.: 270 mg/dL (08 Aug 2024 09:38)      Lower Extremity Physical Exam:  Vascular: DP/PT 1/4, B/L, CFT <3 seconds B/L, Temperature gradient warm to warm, B/L.   Neuro: Epicritic sensation diminished to the level of digits, B/L.  Musculoskeletal/Ortho: unremarkable  Skin: Right foot wound sub first metatarsal head wound periosteum with no drainage, no pus, no tracking or tunneling, no soft tissue crepitus or fluctuance, no malodor, sub 2nd metatarsal hyperkeratosis with no underlying wound. Left foot no open wounds or signs of infection    RADIOLOGY & ADDITIONAL TESTS:  
SURGERY DAILY PROGRESS NOTE    SUBJECTIVE: No acute events overnight. Patient seen and evaluated on AM rounds.    OBJECTIVE:  Vital Signs Last 24 Hrs  T(C): 36.9 (08 Aug 2024 12:06), Max: 37.2 (07 Aug 2024 18:28)  T(F): 98.4 (08 Aug 2024 12:06), Max: 99 (07 Aug 2024 18:28)  HR: 97 (08 Aug 2024 12:06) (88 - 111)  BP: 145/93 (08 Aug 2024 12:06) (145/93 - 166/94)  BP(mean): --  RR: 18 (08 Aug 2024 12:06) (18 - 18)  SpO2: 98% (08 Aug 2024 12:06) (95% - 98%)    Parameters below as of 08 Aug 2024 12:06  Patient On (Oxygen Delivery Method): room air      I&O's Detail    07 Aug 2024 07:01  -  08 Aug 2024 07:00  --------------------------------------------------------  IN:    Oral Fluid: 275 mL  Total IN: 275 mL    OUT:    Voided (mL): 800 mL  Total OUT: 800 mL    Total NET: -525 mL    Daily   MEDICATIONS  (STANDING):  chlorhexidine 2% Cloths 1 Application(s) Topical <User Schedule>  dextrose 5%. 1000 milliLiter(s) (50 mL/Hr) IV Continuous <Continuous>  dextrose 5%. 1000 milliLiter(s) (100 mL/Hr) IV Continuous <Continuous>  dextrose 50% Injectable 25 Gram(s) IV Push once  dextrose 50% Injectable 12.5 Gram(s) IV Push once  dextrose 50% Injectable 25 Gram(s) IV Push once  enoxaparin Injectable 40 milliGRAM(s) SubCutaneous every 24 hours  glucagon  Injectable 1 milliGRAM(s) IntraMuscular once  insulin glargine Injectable (LANTUS) 16 Unit(s) SubCutaneous at bedtime  insulin lispro (ADMELOG) corrective regimen sliding scale   SubCutaneous at bedtime  insulin lispro (ADMELOG) corrective regimen sliding scale   SubCutaneous three times a day before meals  insulin lispro Injectable (ADMELOG) 6 Unit(s) SubCutaneous three times a day before meals  mupirocin 2% Ointment 1 Application(s) Topical <User Schedule>  NIFEdipine XL 60 milliGRAM(s) Oral daily  piperacillin/tazobactam IVPB.. 3.375 Gram(s) IV Intermittent every 8 hours  valsartan 160 milliGRAM(s) Oral daily    MEDICATIONS  (PRN):  dextrose Oral Gel 15 Gram(s) Oral once PRN Blood Glucose LESS THAN 70 milliGRAM(s)/deciliter      LABS:                        14.0   14.03 )-----------( 352      ( 08 Aug 2024 07:28 )             39.7     08-08    136  |  96  |  10  ----------------------------<  241<H>  3.5   |  26  |  0.85    Ca    9.4      08 Aug 2024 07:28        Urinalysis Basic - ( 08 Aug 2024 07:28 )    Color: x / Appearance: x / SG: x / pH: x  Gluc: 241 mg/dL / Ketone: x  / Bili: x / Urobili: x   Blood: x / Protein: x / Nitrite: x   Leuk Esterase: x / RBC: x / WBC x   Sq Epi: x / Non Sq Epi: x / Bacteria: x    PHYSICAL EXAM:  GEN: NAD  RESP: nonlabored breathing on RA  EXTR: Grossly symmetric, WWP. R plantar foot with 1cm wound over 1st metatarsal head with packing in place, no active drainage. Healed ulcer over 2nd metatarsal head  VASC: Palpable femorals, popliteals bilaterally. DP/PT dopplerbale bilaterally    
Patient is a 44y old  Male who presents with a chief complaint of diabetic foot ulcer (06 Aug 2024 19:15)       INTERVAL HPI/OVERNIGHT EVENTS:  Patient seen and evaluated at bedside.  Pt is resting comfortable in NAD. Denies N/V/F/C.    Allergies    No Known Allergies    Intolerances        Vital Signs Last 24 Hrs  T(C): 36.7 (07 Aug 2024 04:43), Max: 37.2 (06 Aug 2024 10:35)  T(F): 98.1 (07 Aug 2024 04:43), Max: 99 (06 Aug 2024 10:35)  HR: 81 (07 Aug 2024 04:43) (81 - 98)  BP: 162/95 (07 Aug 2024 04:43) (162/95 - 216/133)  BP(mean): --  RR: 18 (07 Aug 2024 04:43) (18 - 20)  SpO2: 98% (07 Aug 2024 04:43) (95% - 99%)    Parameters below as of 07 Aug 2024 04:43  Patient On (Oxygen Delivery Method): room air        LABS:                        13.6   14.17 )-----------( 302      ( 07 Aug 2024 07:15 )             40.1     08-07    135  |  95<L>  |  8   ----------------------------<  242<H>  3.5   |  27  |  0.84    Ca    9.2      07 Aug 2024 07:15    TPro  8.0  /  Alb  3.7  /  TBili  0.8  /  DBili  x   /  AST  12  /  ALT  10  /  AlkPhos  85  08-06      Urinalysis Basic - ( 07 Aug 2024 07:15 )    Color: x / Appearance: x / SG: x / pH: x  Gluc: 242 mg/dL / Ketone: x  / Bili: x / Urobili: x   Blood: x / Protein: x / Nitrite: x   Leuk Esterase: x / RBC: x / WBC x   Sq Epi: x / Non Sq Epi: x / Bacteria: x      CAPILLARY BLOOD GLUCOSE      POCT Blood Glucose.: 260 mg/dL (07 Aug 2024 07:21)  POCT Blood Glucose.: 250 mg/dL (06 Aug 2024 22:20)  POCT Blood Glucose.: 381 mg/dL (06 Aug 2024 17:24)      Lower Extremity Physical Exam:  Vascular: DP/PT 1/4, B/L, CFT <3 seconds B/L, Temperature gradient warm to warm, B/L.   Neuro: Epicritic sensation diminished to the level of digits, B/L.  Musculoskeletal/Ortho: unremarkable  Skin: Right foot wound sub first metatarsal head wound periosteum with no drainage, no tracking or tunneling, no soft tissue crepitus or fluctuance, no malodor, sub 2nd metatarsal hyperkeratosis with no underlying wound. Left foot no open wounds or signs of infection    RADIOLOGY & ADDITIONAL TESTS:  
SURGERY DAILY PROGRESS NOTE    SUBJECTIVE: No acute events overnight.    OBJECTIVE:  Vital Signs Last 24 Hrs  T(C): 36.7 (07 Aug 2024 04:43), Max: 37.2 (06 Aug 2024 10:35)  T(F): 98.1 (07 Aug 2024 04:43), Max: 99 (06 Aug 2024 10:35)  HR: 81 (07 Aug 2024 04:43) (81 - 98)  BP: 162/95 (07 Aug 2024 04:43) (162/95 - 210/125)  BP(mean): --  RR: 18 (07 Aug 2024 04:43) (18 - 20)  SpO2: 98% (07 Aug 2024 04:43) (95% - 99%)    Parameters below as of 07 Aug 2024 04:43  Patient On (Oxygen Delivery Method): room air      I&O's Detail    06 Aug 2024 07:01  -  07 Aug 2024 07:00  --------------------------------------------------------  IN:  Total IN: 0 mL    OUT:    Voided (mL): 700 mL  Total OUT: 700 mL    Total NET: -700 mL    Daily   MEDICATIONS  (STANDING):  dextrose 5%. 1000 milliLiter(s) (100 mL/Hr) IV Continuous <Continuous>  dextrose 5%. 1000 milliLiter(s) (50 mL/Hr) IV Continuous <Continuous>  dextrose 50% Injectable 25 Gram(s) IV Push once  dextrose 50% Injectable 12.5 Gram(s) IV Push once  dextrose 50% Injectable 25 Gram(s) IV Push once  enoxaparin Injectable 40 milliGRAM(s) SubCutaneous every 24 hours  glucagon  Injectable 1 milliGRAM(s) IntraMuscular once  insulin glargine Injectable (LANTUS) 10 Unit(s) SubCutaneous at bedtime  insulin lispro (ADMELOG) corrective regimen sliding scale   SubCutaneous at bedtime  insulin lispro (ADMELOG) corrective regimen sliding scale   SubCutaneous three times a day before meals  insulin lispro Injectable (ADMELOG) 4 Unit(s) SubCutaneous three times a day before meals  mupirocin 2% Ointment 1 Application(s) Topical <User Schedule>  piperacillin/tazobactam IVPB.. 3.375 Gram(s) IV Intermittent every 8 hours  valsartan 160 milliGRAM(s) Oral daily    MEDICATIONS  (PRN):  dextrose Oral Gel 15 Gram(s) Oral once PRN Blood Glucose LESS THAN 70 milliGRAM(s)/deciliter    LABS:                        13.6   14.17 )-----------( 302      ( 07 Aug 2024 07:15 )             40.1     08-07    135  |  95<L>  |  8   ----------------------------<  242<H>  3.5   |  27  |  0.84    Ca    9.2      07 Aug 2024 07:15    TPro  8.0  /  Alb  3.7  /  TBili  0.8  /  DBili  x   /  AST  12  /  ALT  10  /  AlkPhos  85  08-06    Urinalysis Basic - ( 07 Aug 2024 07:15 )    Color: x / Appearance: x / SG: x / pH: x  Gluc: 242 mg/dL / Ketone: x  / Bili: x / Urobili: x   Blood: x / Protein: x / Nitrite: x   Leuk Esterase: x / RBC: x / WBC x   Sq Epi: x / Non Sq Epi: x / Bacteria: x    PHYSICAL EXAM:  GEN: NAD  RESP: nonlabored breathing on RA  EXTR: Grossly symmetric, WWP. R plantar foot with 1cm wound over 1st metatarsal head with packing in place, no active drainage. Healed ulcer over 2nd metatarsal head  VASC: Palpable femorals, popliteals bilaterally. DP/PT dopplerbale bilaterally  
Subjective:  Patient states he feels well  HgbA1c pending from this morning  BG in the 200s     MEDICATIONS  (STANDING):  chlorhexidine 2% Cloths 1 Application(s) Topical <User Schedule>  dextrose 5%. 1000 milliLiter(s) (100 mL/Hr) IV Continuous <Continuous>  dextrose 5%. 1000 milliLiter(s) (50 mL/Hr) IV Continuous <Continuous>  dextrose 50% Injectable 25 Gram(s) IV Push once  dextrose 50% Injectable 25 Gram(s) IV Push once  dextrose 50% Injectable 12.5 Gram(s) IV Push once  enoxaparin Injectable 40 milliGRAM(s) SubCutaneous every 24 hours  glucagon  Injectable 1 milliGRAM(s) IntraMuscular once  insulin glargine Injectable (LANTUS) 10 Unit(s) SubCutaneous at bedtime  insulin lispro (ADMELOG) corrective regimen sliding scale   SubCutaneous three times a day before meals  insulin lispro (ADMELOG) corrective regimen sliding scale   SubCutaneous at bedtime  insulin lispro Injectable (ADMELOG) 4 Unit(s) SubCutaneous three times a day before meals  mupirocin 2% Ointment 1 Application(s) Topical <User Schedule>  NIFEdipine XL 60 milliGRAM(s) Oral daily  piperacillin/tazobactam IVPB.. 3.375 Gram(s) IV Intermittent every 8 hours  valsartan 160 milliGRAM(s) Oral daily    MEDICATIONS  (PRN):  dextrose Oral Gel 15 Gram(s) Oral once PRN Blood Glucose LESS THAN 70 milliGRAM(s)/deciliter      Allergies    No Known Allergies    Intolerances      Review of Systems:  Constitutional: No fever  Eyes: No blurry vision  Neuro: No tremors  HEENT: No pain  Cardiovascular: No chest pain, palpitations  Respiratory: No SOB, no cough  GI: No nausea, vomiting, abdominal pain  : No dysuria  Skin: no rash  Psych: no depression  Endocrine: no polyuria, polydipsia  Hem/lymph: no swelling  Osteoporosis: no fractures    ALL OTHER SYSTEMS REVIEWED AND NEGATIVE    UNABLE TO OBTAIN    PHYSICAL EXAM:  VITALS: T(C): 37.1 (08-07-24 @ 09:52)  T(F): 98.7 (08-07-24 @ 09:52), Max: 99 (08-06-24 @ 21:29)  HR: 97 (08-07-24 @ 09:52) (81 - 98)  BP: 182/117 (08-07-24 @ 09:52) (160/90 - 210/125)  RR:  (18 - 20)  SpO2:  (95% - 99%)  Wt(kg): --  GENERAL: NAD, well-groomed, well-developed  EYES: No proptosis, no lid lag, anicteric  HEENT:  Atraumatic, Normocephalic, moist mucous membranes  THYROID: Normal size, no palpable nodules  RESPIRATORY: Clear to auscultation bilaterally; No rales, rhonchi, wheezing, or rubs  CARDIOVASCULAR: Regular rate and rhythm; No murmurs; no peripheral edema  GI: Soft, nontender, non distended, normal bowel sounds  SKIN: Dry, intact, No rashes or lesions  MUSCULOSKELETAL: Full range of motion, normal strength  NEURO: sensation intact, extraocular movements intact, no tremor, normal reflexes  PSYCH: Alert and oriented x 3, normal affect, normal mood  CUSHING'S SIGNS: no striae    POCT Blood Glucose.: 237 mg/dL (08-07-24 @ 11:58)  POCT Blood Glucose.: 260 mg/dL (08-07-24 @ 07:21)  POCT Blood Glucose.: 250 mg/dL (08-06-24 @ 22:20)  POCT Blood Glucose.: 381 mg/dL (08-06-24 @ 17:24)      08-07    135  |  95<L>  |  8   ----------------------------<  242<H>  3.5   |  27  |  0.84    eGFR: 110    Ca    9.2      08-07    TPro  8.0  /  Alb  3.7  /  TBili  0.8  /  DBili  x   /  AST  12  /  ALT  10  /  AlkPhos  85  08-06          Thyroid Function Tests:  08-07 @ 07:15 TSH 1.67 FreeT4 -- T3 -- Anti TPO -- Anti Thyroglobulin Ab -- TSI --                      
infectious diseases progress note:    Patient is a 44y old  Male who presents with a chief complaint of diabetic foot ulcer (09 Aug 2024 08:41)        Cellulitis of right lower extremity               Allergies    No Known Allergies    Intolerances        ANTIBIOTICS/RELEVANT:  antimicrobials  piperacillin/tazobactam IVPB.. 3.375 Gram(s) IV Intermittent every 8 hours    immunologic:    OTHER:  chlorhexidine 2% Cloths 1 Application(s) Topical <User Schedule>  dextrose 5%. 1000 milliLiter(s) IV Continuous <Continuous>  dextrose 5%. 1000 milliLiter(s) IV Continuous <Continuous>  dextrose 50% Injectable 25 Gram(s) IV Push once  dextrose 50% Injectable 12.5 Gram(s) IV Push once  dextrose 50% Injectable 25 Gram(s) IV Push once  dextrose Oral Gel 15 Gram(s) Oral once PRN  enoxaparin Injectable 40 milliGRAM(s) SubCutaneous every 24 hours  glucagon  Injectable 1 milliGRAM(s) IntraMuscular once  insulin glargine Injectable (LANTUS) 19 Unit(s) SubCutaneous at bedtime  insulin lispro (ADMELOG) corrective regimen sliding scale   SubCutaneous three times a day before meals  insulin lispro (ADMELOG) corrective regimen sliding scale   SubCutaneous at bedtime  insulin lispro Injectable (ADMELOG) 9 Unit(s) SubCutaneous three times a day before meals  mupirocin 2% Ointment 1 Application(s) Topical <User Schedule>  NIFEdipine XL 60 milliGRAM(s) Oral daily  valsartan 160 milliGRAM(s) Oral daily      Objective:  Vital Signs Last 24 Hrs  T(C): 37.3 (09 Aug 2024 09:00), Max: 37.3 (09 Aug 2024 09:00)  T(F): 99.1 (09 Aug 2024 09:00), Max: 99.1 (09 Aug 2024 09:00)  HR: 106 (09 Aug 2024 09:00) (88 - 106)  BP: 133/87 (09 Aug 2024 09:00) (133/87 - 163/93)  BP(mean): --  RR: 18 (09 Aug 2024 09:00) (18 - 18)  SpO2: 99% (09 Aug 2024 09:00) (98% - 99%)    Parameters below as of 09 Aug 2024 09:00  Patient On (Oxygen Delivery Method): room air       Eyes:ANA, EOMI  Ear/Nose/Throat: no oral lesion, no sinus tenderness on percussion	  Neck:no JVD, no lymphadenopathy, supple  Respiratory: CTA sae  Cardiovascular: S1S2 RRR, no murmurs  Gastrointestinal:soft, (+) BS, no HSM  Extremities:no e/e/c        LABS:                        13.8   12.57 )-----------( 335      ( 09 Aug 2024 07:07 )             40.0     08-08    136  |  96  |  10  ----------------------------<  241<H>  3.5   |  26  |  0.85    Ca    9.4      08 Aug 2024 07:28        Urinalysis Basic - ( 08 Aug 2024 07:28 )    Color: x / Appearance: x / SG: x / pH: x  Gluc: 241 mg/dL / Ketone: x  / Bili: x / Urobili: x   Blood: x / Protein: x / Nitrite: x   Leuk Esterase: x / RBC: x / WBC x   Sq Epi: x / Non Sq Epi: x / Bacteria: x          MICROBIOLOGY:    RECENT CULTURES:  08-06 @ 17:47 .Tissue Other   TERRY    Moderate polymorphonuclear leukocytes seen per low power field  Few Gram positive cocci in pairs seen per oil power field    Staphylococcus aureus  Klebsiella pneumoniae  Enterobacter cloacae complex  Enterobacter cloacae complex     Moderate Staphylococcus aureus  Few Klebsiella pneumoniae  Few Enterobacter cloacae complex    08-06 @ 12:39 .Surgical Swab Right Foot   TERRY      Staphylococcus aureus  Enterobacter cloacae complex  Klebsiella pneumoniae  Enterobacter cloacae complex     Few Staphylococcus aureus  Few Enterobacter cloacae complex  Few Klebsiella pneumoniae    08-06 @ 10:40 .Blood Blood   PCR    Growth in aerobic bottle: Gram Positive Rods    Blood Culture PCR  Blood Culture PCR     Growth in aerobic bottle: Microbacterium paraoxydans "Susceptibilities  not performed"  Direct identification is available within approximately 3-5  hours either by Blood Panel Multiplexed PCR or Direct  MALDI-TOF. Details: [email or web address]    08-06 @ 10:15 .Blood Blood                No growth at 48 Hours          RESPIRATORY CULTURES:              RADIOLOGY & ADDITIONAL STUDIES:        Pager [phone]  After 5 pm/weekends or if no response :[phone]
Patient is a 44y old  Male who presents with a chief complaint of diabetic foot ulcer (08 Aug 2024 14:28)       INTERVAL HPI/OVERNIGHT EVENTS:  Patient seen and evaluated at bedside.  Pt is resting comfortable in NAD. Denies N/V/F/C.      Allergies    No Known Allergies    Intolerances        Vital Signs Last 24 Hrs  T(C): 36.9 (08 Aug 2024 12:06), Max: 37.2 (07 Aug 2024 18:28)  T(F): 98.4 (08 Aug 2024 12:06), Max: 99 (07 Aug 2024 18:28)  HR: 97 (08 Aug 2024 12:06) (88 - 111)  BP: 145/93 (08 Aug 2024 12:06) (145/93 - 166/94)  BP(mean): --  RR: 18 (08 Aug 2024 12:06) (18 - 18)  SpO2: 98% (08 Aug 2024 12:06) (95% - 98%)    Parameters below as of 08 Aug 2024 12:06  Patient On (Oxygen Delivery Method): room air        LABS:                        14.0   14.03 )-----------( 352      ( 08 Aug 2024 07:28 )             39.7     08-08    136  |  96  |  10  ----------------------------<  241<H>  3.5   |  26  |  0.85    Ca    9.4      08 Aug 2024 07:28        Urinalysis Basic - ( 08 Aug 2024 07:28 )    Color: x / Appearance: x / SG: x / pH: x  Gluc: 241 mg/dL / Ketone: x  / Bili: x / Urobili: x   Blood: x / Protein: x / Nitrite: x   Leuk Esterase: x / RBC: x / WBC x   Sq Epi: x / Non Sq Epi: x / Bacteria: x      CAPILLARY BLOOD GLUCOSE      POCT Blood Glucose.: 252 mg/dL (08 Aug 2024 12:48)  POCT Blood Glucose.: 270 mg/dL (08 Aug 2024 09:38)  POCT Blood Glucose.: 198 mg/dL (07 Aug 2024 21:04)  POCT Blood Glucose.: 228 mg/dL (07 Aug 2024 17:25)      Lower Extremity Physical Exam:  Vascular: DP/PT 1/4, B/L, CFT <3 seconds B/L, Temperature gradient warm to warm, B/L.   Neuro: Epicritic sensation diminished to the level of digits, B/L.  Musculoskeletal/Ortho: unremarkable  Skin: Right foot wound sub first metatarsal head wound periosteum with no drainage, no pus, no tracking or tunneling, no soft tissue crepitus or fluctuance, no malodor, sub 2nd metatarsal hyperkeratosis with no underlying wound. Left foot no open wounds or signs of infection    RADIOLOGY & ADDITIONAL TESTS:  < from: MR Foot w/wo IV Cont, Right (08.08.24 @ 03:30) >    ACC: [id number] EXAM:  MR FOOT WAW IC RT   ORDERED BY: ROD DIEGO     PROCEDURE DATE:  08/08/2024          INTERPRETATION:  CLINICAL INFORMATION: Submetatarsal right foot wound    COMPARISON: Right foot MRI 9/1/2017. Foot radiographs 8/6/2024    CONTRAST:  IV Contrast: Gadavist  12 cc administered  3 cc discarded  Complications: None reported at time of study completion    TECHNIQUE: MRI of the right foot was performed.    FINDINGS:    LOCALIZER: No additional findings.    Soft tissue wound ispresent plantar at the level of the first   metatarsophalangeal joint. Wound extends to the flexor hallucis   tendon/tendon sheath. Ill-defined fluid and enhancement course along the   flexor hallucis tendon sheath to the level of the midfoot at the edge of   the field of view on the MRI, consistent with septic tenosynovitis.   Proximal extent of fluid tracking along the tendon sheath is not   visualized. There is preservation of the T1 marrow signal. No marrow   edema or enhancement. Status postresection of the second metatarsal head   with likely replacement. Wound is in proximity to the first   metatarsophalangeal joint capsule without evidence of septic arthritis at   this time. There is diffuse increased muscle signal most consistent with   denervation changes. There is dorsal subcutaneous edema.      IMPRESSION:  Plantar soft tissue wound tracking to the flexor hallucis tendon sheath   with septic tenosynovitis of the flexor hallucis. Proximal extent is not   included in the field-of-view.  No evidence of osteomyelitis or septic arthritis. Wound is in proximity   to the plantar capsule of the first metatarsophalangeal joint making this   susceptible to penetrating infection.        --- End of Report ---            KARMA COLUNGA MD; Attending Radiologist  This document has been electronically signed. Aug  8 2024  7:53AM    < end of copied text >  
Subjective:  Seen at the bedside with patient's wife. Discussed that glucose remains elevated. Patient said he has white coat syndrome which makes his glucose erratic. Good appetite.        MEDICATIONS  (STANDING):  chlorhexidine 2% Cloths 1 Application(s) Topical <User Schedule>  dextrose 5%. 1000 milliLiter(s) (100 mL/Hr) IV Continuous <Continuous>  dextrose 5%. 1000 milliLiter(s) (50 mL/Hr) IV Continuous <Continuous>  dextrose 50% Injectable 25 Gram(s) IV Push once  dextrose 50% Injectable 25 Gram(s) IV Push once  dextrose 50% Injectable 12.5 Gram(s) IV Push once  enoxaparin Injectable 40 milliGRAM(s) SubCutaneous every 24 hours  glucagon  Injectable 1 milliGRAM(s) IntraMuscular once  insulin glargine Injectable (LANTUS) 16 Unit(s) SubCutaneous at bedtime  insulin lispro (ADMELOG) corrective regimen sliding scale   SubCutaneous three times a day before meals  insulin lispro (ADMELOG) corrective regimen sliding scale   SubCutaneous at bedtime  insulin lispro Injectable (ADMELOG) 6 Unit(s) SubCutaneous three times a day before meals  mupirocin 2% Ointment 1 Application(s) Topical <User Schedule>  NIFEdipine XL 60 milliGRAM(s) Oral daily  piperacillin/tazobactam IVPB.. 3.375 Gram(s) IV Intermittent every 8 hours  valsartan 160 milliGRAM(s) Oral daily    MEDICATIONS  (PRN):  dextrose Oral Gel 15 Gram(s) Oral once PRN Blood Glucose LESS THAN 70 milliGRAM(s)/deciliter      Allergies    No Known Allergies    Intolerances      Review of Systems:  Constitutional: No fever  Eyes: No blurry vision  Neuro: No tremors  HEENT: No pain  Cardiovascular: No chest pain, palpitations  Respiratory: No SOB, no cough  GI: No nausea, vomiting, abdominal pain  : No dysuria  Skin: no rash  Psych: no depression  Endocrine: no polyuria, polydipsia  Hem/lymph: no swelling  Osteoporosis: no fractures    ALL OTHER SYSTEMS REVIEWED AND NEGATIVE    UNABLE TO OBTAIN    PHYSICAL EXAM:   Vital Signs Last 24 Hrs  T(C): 36.9 (08 Aug 2024 12:06), Max: 37.2 (07 Aug 2024 18:28)  T(F): 98.4 (08 Aug 2024 12:06), Max: 99 (07 Aug 2024 18:28)  HR: 97 (08 Aug 2024 12:06) (88 - 111)  BP: 145/93 (08 Aug 2024 12:06) (145/93 - 166/94)  BP(mean): --  RR: 18 (08 Aug 2024 12:06) (18 - 18)  SpO2: 98% (08 Aug 2024 12:06) (95% - 98%)    Parameters below as of 08 Aug 2024 12:06  Patient On (Oxygen Delivery Method): room air      GENERAL: NAD, well-groomed, well-developed  EYES: No proptosis, no lid lag, anicteric  RESPIRATORY: on room air, no accessory muscle use   PSYCH: Alert and oriented x 3, normal affect, normal mood  CUSHING'S SIGNS: no striae    CAPILLARY BLOOD GLUCOSE      POCT Blood Glucose.: 252 mg/dL (08 Aug 2024 12:48)  POCT Blood Glucose.: 270 mg/dL (08 Aug 2024 09:38)  POCT Blood Glucose.: 198 mg/dL (07 Aug 2024 21:04)  POCT Blood Glucose.: 228 mg/dL (07 Aug 2024 17:25)    POCT Blood Glucose.: 237 mg/dL (08-07-24 @ 11:58)  POCT Blood Glucose.: 260 mg/dL (08-07-24 @ 07:21)  POCT Blood Glucose.: 250 mg/dL (08-06-24 @ 22:20)  POCT Blood Glucose.: 381 mg/dL (08-06-24 @ 17:24)     08-08    136  |  96  |  10  ----------------------------<  241<H>  3.5   |  26  |  0.85    Ca    9.4      08 Aug 2024 07:28              Thyroid Function Tests:  08-07 @ 07:15 TSH 1.67 FreeT4 -- T3 -- Anti TPO -- Anti Thyroglobulin Ab -- TSI --                      
infectious diseases progress note:    Patient is a 44y old  Male who presents with a chief complaint of diabetic foot ulcer (07 Aug 2024 15:21)        Cellulitis of right lower extremity               Allergies    No Known Allergies    Intolerances        ANTIBIOTICS/RELEVANT:  antimicrobials  piperacillin/tazobactam IVPB.. 3.375 Gram(s) IV Intermittent every 8 hours    immunologic:    OTHER:  chlorhexidine 2% Cloths 1 Application(s) Topical <User Schedule>  dextrose 5%. 1000 milliLiter(s) IV Continuous <Continuous>  dextrose 5%. 1000 milliLiter(s) IV Continuous <Continuous>  dextrose 50% Injectable 25 Gram(s) IV Push once  dextrose 50% Injectable 12.5 Gram(s) IV Push once  dextrose 50% Injectable 25 Gram(s) IV Push once  dextrose Oral Gel 15 Gram(s) Oral once PRN  enoxaparin Injectable 40 milliGRAM(s) SubCutaneous every 24 hours  glucagon  Injectable 1 milliGRAM(s) IntraMuscular once  insulin glargine Injectable (LANTUS) 16 Unit(s) SubCutaneous at bedtime  insulin lispro (ADMELOG) corrective regimen sliding scale   SubCutaneous at bedtime  insulin lispro (ADMELOG) corrective regimen sliding scale   SubCutaneous three times a day before meals  insulin lispro Injectable (ADMELOG) 6 Unit(s) SubCutaneous three times a day before meals  mupirocin 2% Ointment 1 Application(s) Topical <User Schedule>  NIFEdipine XL 60 milliGRAM(s) Oral daily  valsartan 160 milliGRAM(s) Oral daily      Objective:  Vital Signs Last 24 Hrs  T(C): 36.8 (08 Aug 2024 06:23), Max: 37.2 (07 Aug 2024 18:28)  T(F): 98.3 (08 Aug 2024 06:23), Max: 99 (07 Aug 2024 18:28)  HR: 88 (08 Aug 2024 06:23) (82 - 111)  BP: 166/94 (08 Aug 2024 06:23) (157/96 - 182/117)  BP(mean): --  RR: 18 (08 Aug 2024 06:23) (18 - 18)  SpO2: 95% (08 Aug 2024 06:23) (95% - 98%)    Parameters below as of 08 Aug 2024 06:23  Patient On (Oxygen Delivery Method): room air           Eyes:ANA, EOMI  Ear/Nose/Throat: no oral lesion, no sinus tenderness on percussion	  Neck:no JVD, no lymphadenopathy, supple  Respiratory: CTA sae  Cardiovascular: S1S2 RRR, no murmurs  Gastrointestinal:soft, (+) BS, no HSM  Extremities:no e/e/c        LABS:                        14.0   14.03 )-----------( 352      ( 08 Aug 2024 07:28 )             39.7     08-08    136  |  96  |  10  ----------------------------<  241<H>  3.5   |  26  |  0.85    Ca    9.4      08 Aug 2024 07:28    TPro  8.0  /  Alb  3.7  /  TBili  0.8  /  DBili  x   /  AST  12  /  ALT  10  /  AlkPhos  85  08-06      Urinalysis Basic - ( 08 Aug 2024 07:28 )    Color: x / Appearance: x / SG: x / pH: x  Gluc: 241 mg/dL / Ketone: x  / Bili: x / Urobili: x   Blood: x / Protein: x / Nitrite: x   Leuk Esterase: x / RBC: x / WBC x   Sq Epi: x / Non Sq Epi: x / Bacteria: x          MICROBIOLOGY:    RECENT CULTURES:  08-06 @ 17:47 .Tissue Other       Moderate polymorphonuclear leukocytes seen per low power field  Few Gram positive cocci in pairs seen per oil power field           Moderate Staphylococcus aureus  Few Klebsiella pneumoniae  Few Enterobacter cloacae complex    08-06 @ 12:39 .Surgical Swab Right Foot                Few Staphylococcus aureus  Few Enterobacter cloacae complex    08-06 @ 10:40 .Blood Blood   PCR    Growth in aerobic bottle: Gram Positive Rods    Blood Culture PCR  Blood Culture PCR     Growth in aerobic bottle: Gram Positive Rods  Direct identification is available within approximately 3-5  hours either by Blood Panel Multiplexed PCR or Direct  MALDI-TOF. Details: [email or web address]    08-06 @ 10:15 .Blood Blood                No growth at 24 hours          RESPIRATORY CULTURES:              RADIOLOGY & ADDITIONAL STUDIES:        Pager [phone]  After 5 pm/weekends or if no response :[phone]
infectious diseases progress note:    Patient is a 44y old  Male who presents with a chief complaint of diabetic foot ulcer (07 Aug 2024 09:33)        Cellulitis of right lower extremity          R     Allergies    No Known Allergies    Intolerances        ANTIBIOTICS/RELEVANT:  antimicrobials  piperacillin/tazobactam IVPB.. 3.375 Gram(s) IV Intermittent every 8 hours    immunologic:    OTHER:  dextrose 5%. 1000 milliLiter(s) IV Continuous <Continuous>  dextrose 5%. 1000 milliLiter(s) IV Continuous <Continuous>  dextrose 50% Injectable 25 Gram(s) IV Push once  dextrose 50% Injectable 12.5 Gram(s) IV Push once  dextrose 50% Injectable 25 Gram(s) IV Push once  dextrose Oral Gel 15 Gram(s) Oral once PRN  enoxaparin Injectable 40 milliGRAM(s) SubCutaneous every 24 hours  glucagon  Injectable 1 milliGRAM(s) IntraMuscular once  insulin glargine Injectable (LANTUS) 10 Unit(s) SubCutaneous at bedtime  insulin lispro (ADMELOG) corrective regimen sliding scale   SubCutaneous at bedtime  insulin lispro (ADMELOG) corrective regimen sliding scale   SubCutaneous three times a day before meals  insulin lispro Injectable (ADMELOG) 4 Unit(s) SubCutaneous three times a day before meals  mupirocin 2% Ointment 1 Application(s) Topical <User Schedule>  NIFEdipine XL 60 milliGRAM(s) Oral daily  valsartan 160 milliGRAM(s) Oral daily      Objective:  Vital Signs Last 24 Hrs  T(C): 37.1 (07 Aug 2024 09:52), Max: 37.2 (06 Aug 2024 19:57)  T(F): 98.7 (07 Aug 2024 09:52), Max: 99 (06 Aug 2024 21:29)  HR: 97 (07 Aug 2024 09:52) (81 - 98)  BP: 182/117 (07 Aug 2024 09:52) (160/90 - 210/125)  BP(mean): --  RR: 18 (07 Aug 2024 09:52) (18 - 20)  SpO2: 98% (07 Aug 2024 09:52) (95% - 99%)    Parameters below as of 07 Aug 2024 09:52  Patient On (Oxygen Delivery Method): room air           Eyes:ANA, EOMI  Ear/Nose/Throat: no oral lesion, no sinus tenderness on percussion	  Neck:no JVD, no lymphadenopathy, supple  Respiratory: CTA sae  Cardiovascular: S1S2 RRR, no murmurs  Gastrointestinal:soft, (+) BS, no HSM  Extremities:no e/e/c        LABS:                        13.6   14.17 )-----------( 302      ( 07 Aug 2024 07:15 )             40.1     08-07    135  |  95<L>  |  8   ----------------------------<  242<H>  3.5   |  27  |  0.84    Ca    9.2      07 Aug 2024 07:15    TPro  8.0  /  Alb  3.7  /  TBili  0.8  /  DBili  x   /  AST  12  /  ALT  10  /  AlkPhos  85  08-06      Urinalysis Basic - ( 07 Aug 2024 07:15 )    Color: x / Appearance: x / SG: x / pH: x  Gluc: 242 mg/dL / Ketone: x  / Bili: x / Urobili: x   Blood: x / Protein: x / Nitrite: x   Leuk Esterase: x / RBC: x / WBC x   Sq Epi: x / Non Sq Epi: x / Bacteria: x          MICROBIOLOGY:    RECENT CULTURES:  08-06 @ 17:47 .Tissue Other       Moderate polymorphonuclear leukocytes seen per low power field  Few Gram positive cocci in pairs seen per oil power field                   RESPIRATORY CULTURES:              RADIOLOGY & ADDITIONAL STUDIES:        Pager [phone]  After 5 pm/weekends or if no response :[phone]
Patient is a 44y old  Male who presents with a chief complaint of diabetic foot ulcer (07 Aug 2024 12:43)      DATE OF SERVICE: 08-07-24 @ 15:21    SUBJECTIVE / OVERNIGHT EVENTS: overnight events noted    ROS:  Resp: No cough no sputum production  CVS: No chest pain no palpitations no orthopnea  GI: no N/V/D  "I feel much better"       MEDICATIONS  (STANDING):  chlorhexidine 2% Cloths 1 Application(s) Topical <User Schedule>  dextrose 5%. 1000 milliLiter(s) (50 mL/Hr) IV Continuous <Continuous>  dextrose 5%. 1000 milliLiter(s) (100 mL/Hr) IV Continuous <Continuous>  dextrose 50% Injectable 25 Gram(s) IV Push once  dextrose 50% Injectable 12.5 Gram(s) IV Push once  dextrose 50% Injectable 25 Gram(s) IV Push once  enoxaparin Injectable 40 milliGRAM(s) SubCutaneous every 24 hours  glucagon  Injectable 1 milliGRAM(s) IntraMuscular once  insulin glargine Injectable (LANTUS) 16 Unit(s) SubCutaneous at bedtime  insulin lispro (ADMELOG) corrective regimen sliding scale   SubCutaneous at bedtime  insulin lispro (ADMELOG) corrective regimen sliding scale   SubCutaneous three times a day before meals  insulin lispro Injectable (ADMELOG) 6 Unit(s) SubCutaneous three times a day before meals  mupirocin 2% Ointment 1 Application(s) Topical <User Schedule>  NIFEdipine XL 60 milliGRAM(s) Oral daily  piperacillin/tazobactam IVPB.. 3.375 Gram(s) IV Intermittent every 8 hours  valsartan 160 milliGRAM(s) Oral daily    MEDICATIONS  (PRN):  dextrose Oral Gel 15 Gram(s) Oral once PRN Blood Glucose LESS THAN 70 milliGRAM(s)/deciliter        CAPILLARY BLOOD GLUCOSE      POCT Blood Glucose.: 237 mg/dL (07 Aug 2024 11:58)  POCT Blood Glucose.: 260 mg/dL (07 Aug 2024 07:21)  POCT Blood Glucose.: 250 mg/dL (06 Aug 2024 22:20)  POCT Blood Glucose.: 381 mg/dL (06 Aug 2024 17:24)    I&O's Summary    06 Aug 2024 07:01  -  07 Aug 2024 07:00  --------------------------------------------------------  IN: 0 mL / OUT: 700 mL / NET: -700 mL    07 Aug 2024 07:01  -  07 Aug 2024 15:21  --------------------------------------------------------  IN: 275 mL / OUT: 800 mL / NET: -525 mL        Vital Signs Last 24 Hrs  T(C): 37.1 (07 Aug 2024 13:24), Max: 37.2 (06 Aug 2024 19:57)  T(F): 98.8 (07 Aug 2024 13:24), Max: 99 (06 Aug 2024 21:29)  HR: 90 (07 Aug 2024 13:24) (81 - 98)  BP: 162/101 (07 Aug 2024 13:24) (160/90 - 189/119)  BP(mean): --  RR: 18 (07 Aug 2024 13:24) (18 - 20)  SpO2: 98% (07 Aug 2024 13:24) (95% - 99%)    PHYSICAL EXAM  NECK: Supple,   CHEST/LUNG: clear   HEART: S1 S2; no murmurs   ABDOMEN: Soft, Nontender  EXTREMITIES:   no edema  improved erythema right foot   NEUROLOGY: AO x 3 non-focal      LABS:                        13.6   14.17 )-----------( 302      ( 07 Aug 2024 07:15 )             40.1     08-07    135  |  95<L>  |  8   ----------------------------<  242<H>  3.5   |  27  |  0.84    Ca    9.2      07 Aug 2024 07:15    TPro  8.0  /  Alb  3.7  /  TBili  0.8  /  DBili  x   /  AST  12  /  ALT  10  /  AlkPhos  85  08-06          Urinalysis Basic - ( 07 Aug 2024 07:15 )    Color: x / Appearance: x / SG: x / pH: x  Gluc: 242 mg/dL / Ketone: x  / Bili: x / Urobili: x   Blood: x / Protein: x / Nitrite: x   Leuk Esterase: x / RBC: x / WBC x   Sq Epi: x / Non Sq Epi: x / Bacteria: x          All consultant(s) notes reviewed and care discussed with other providers        Contact Number, Dr Ram [phone]
Patient is a 44y old  Male who presents with a chief complaint of diabetic foot ulcer (09 Aug 2024 09:38)      DATE OF SERVICE: 08-09-24 @ 14:44    SUBJECTIVE / OVERNIGHT EVENTS: overnight events noted    ROS:  Resp: No cough no sputum production  CVS: No chest pain no palpitations no orthopnea  GI: no N/V/D  "I feel much better"       MEDICATIONS  (STANDING):  chlorhexidine 2% Cloths 1 Application(s) Topical <User Schedule>  dextrose 5%. 1000 milliLiter(s) (50 mL/Hr) IV Continuous <Continuous>  dextrose 5%. 1000 milliLiter(s) (100 mL/Hr) IV Continuous <Continuous>  dextrose 50% Injectable 25 Gram(s) IV Push once  dextrose 50% Injectable 12.5 Gram(s) IV Push once  dextrose 50% Injectable 25 Gram(s) IV Push once  enoxaparin Injectable 40 milliGRAM(s) SubCutaneous every 24 hours  glucagon  Injectable 1 milliGRAM(s) IntraMuscular once  insulin glargine Injectable (LANTUS) 19 Unit(s) SubCutaneous at bedtime  insulin lispro (ADMELOG) corrective regimen sliding scale   SubCutaneous at bedtime  insulin lispro (ADMELOG) corrective regimen sliding scale   SubCutaneous three times a day before meals  insulin lispro Injectable (ADMELOG) 9 Unit(s) SubCutaneous three times a day before meals  mupirocin 2% Ointment 1 Application(s) Topical <User Schedule>  NIFEdipine XL 60 milliGRAM(s) Oral daily  piperacillin/tazobactam IVPB.. 3.375 Gram(s) IV Intermittent every 8 hours  valsartan 160 milliGRAM(s) Oral daily    MEDICATIONS  (PRN):  dextrose Oral Gel 15 Gram(s) Oral once PRN Blood Glucose LESS THAN 70 milliGRAM(s)/deciliter        CAPILLARY BLOOD GLUCOSE      POCT Blood Glucose.: 234 mg/dL (09 Aug 2024 11:56)  POCT Blood Glucose.: 186 mg/dL (09 Aug 2024 08:26)  POCT Blood Glucose.: 170 mg/dL (08 Aug 2024 20:52)  POCT Blood Glucose.: 290 mg/dL (08 Aug 2024 17:38)    I&O's Summary    08 Aug 2024 07:01  -  09 Aug 2024 07:00  --------------------------------------------------------  IN: 640 mL / OUT: 650 mL / NET: -10 mL    09 Aug 2024 07:01  -  09 Aug 2024 14:44  --------------------------------------------------------  IN: 250 mL / OUT: 0 mL / NET: 250 mL        Vital Signs Last 24 Hrs  T(C): 37.3 (09 Aug 2024 09:00), Max: 37.3 (09 Aug 2024 09:00)  T(F): 99.1 (09 Aug 2024 09:00), Max: 99.1 (09 Aug 2024 09:00)  HR: 106 (09 Aug 2024 09:00) (88 - 106)  BP: 133/87 (09 Aug 2024 09:00) (133/87 - 163/93)  BP(mean): --  RR: 18 (09 Aug 2024 09:00) (18 - 18)  SpO2: 99% (09 Aug 2024 09:00) (99% - 99%)    PHYSICAL EXAM  NECK: Supple,   CHEST/LUNG: clear   HEART: S1 S2; no murmurs   ABDOMEN: Soft, Nontender  EXTREMITIES:   no edema  midline in  NEUROLOGY: AO x 3 non-focal    LABS:                        13.8   12.57 )-----------( 335      ( 09 Aug 2024 07:07 )             40.0     08-08    136  |  96  |  10  ----------------------------<  241<H>  3.5   |  26  |  0.85    Ca    9.4      08 Aug 2024 07:28            Urinalysis Basic - ( 08 Aug 2024 07:28 )    Color: x / Appearance: x / SG: x / pH: x  Gluc: 241 mg/dL / Ketone: x  / Bili: x / Urobili: x   Blood: x / Protein: x / Nitrite: x   Leuk Esterase: x / RBC: x / WBC x   Sq Epi: x / Non Sq Epi: x / Bacteria: x          All consultant(s) notes reviewed and care discussed with other providers        Contact Number, Dr Ram [phone]
Patient is a 44y old  Male who presents with a chief complaint of diabetic foot ulcer (08 Aug 2024 14:36)      DATE OF SERVICE: 08-08-24 @ 15:38    SUBJECTIVE / OVERNIGHT EVENTS: overnight events noted    ROS:  Resp: No cough no sputum production  CVS: No chest pain no palpitations no orthopnea  GI: no N/V/D      MEDICATIONS  (STANDING):  chlorhexidine 2% Cloths 1 Application(s) Topical <User Schedule>  dextrose 5%. 1000 milliLiter(s) (50 mL/Hr) IV Continuous <Continuous>  dextrose 5%. 1000 milliLiter(s) (100 mL/Hr) IV Continuous <Continuous>  dextrose 50% Injectable 25 Gram(s) IV Push once  dextrose 50% Injectable 12.5 Gram(s) IV Push once  dextrose 50% Injectable 25 Gram(s) IV Push once  enoxaparin Injectable 40 milliGRAM(s) SubCutaneous every 24 hours  glucagon  Injectable 1 milliGRAM(s) IntraMuscular once  insulin glargine Injectable (LANTUS) 19 Unit(s) SubCutaneous at bedtime  insulin lispro (ADMELOG) corrective regimen sliding scale   SubCutaneous at bedtime  insulin lispro (ADMELOG) corrective regimen sliding scale   SubCutaneous three times a day before meals  insulin lispro Injectable (ADMELOG) 9 Unit(s) SubCutaneous three times a day before meals  mupirocin 2% Ointment 1 Application(s) Topical <User Schedule>  NIFEdipine XL 60 milliGRAM(s) Oral daily  piperacillin/tazobactam IVPB.. 3.375 Gram(s) IV Intermittent every 8 hours  valsartan 160 milliGRAM(s) Oral daily    MEDICATIONS  (PRN):  dextrose Oral Gel 15 Gram(s) Oral once PRN Blood Glucose LESS THAN 70 milliGRAM(s)/deciliter        CAPILLARY BLOOD GLUCOSE      POCT Blood Glucose.: 252 mg/dL (08 Aug 2024 12:48)  POCT Blood Glucose.: 270 mg/dL (08 Aug 2024 09:38)  POCT Blood Glucose.: 198 mg/dL (07 Aug 2024 21:04)  POCT Blood Glucose.: 228 mg/dL (07 Aug 2024 17:25)    I&O's Summary    07 Aug 2024 07:01  -  08 Aug 2024 07:00  --------------------------------------------------------  IN: 275 mL / OUT: 800 mL / NET: -525 mL        Vital Signs Last 24 Hrs  T(C): 36.9 (08 Aug 2024 12:06), Max: 37.2 (07 Aug 2024 18:28)  T(F): 98.4 (08 Aug 2024 12:06), Max: 99 (07 Aug 2024 18:28)  HR: 97 (08 Aug 2024 12:06) (88 - 111)  BP: 145/93 (08 Aug 2024 12:06) (145/93 - 166/94)  BP(mean): --  RR: 18 (08 Aug 2024 12:06) (18 - 18)  SpO2: 98% (08 Aug 2024 12:06) (95% - 98%)    PHYSICAL EXAM  NECK: Supple,   CHEST/LUNG: clear   HEART: S1 S2; no murmurs   ABDOMEN: Soft, Nontender  EXTREMITIES:   no edema  improved erythema right foot   NEUROLOGY: AO x 3 non-focal    LABS:                        14.0   14.03 )-----------( 352      ( 08 Aug 2024 07:28 )             39.7     08-08    136  |  96  |  10  ----------------------------<  241<H>  3.5   |  26  |  0.85    Ca    9.4      08 Aug 2024 07:28            Urinalysis Basic - ( 08 Aug 2024 07:28 )    Color: x / Appearance: x / SG: x / pH: x  Gluc: 241 mg/dL / Ketone: x  / Bili: x / Urobili: x   Blood: x / Protein: x / Nitrite: x   Leuk Esterase: x / RBC: x / WBC x   Sq Epi: x / Non Sq Epi: x / Bacteria: x          All consultant(s) notes reviewed and care discussed with other providers        Contact Number, Dr Ram [phone]

## 2024-08-10 RX ORDER — MUPIROCIN CALCIUM 20 MG/G
1 CREAM TOPICAL
Qty: 30 | Refills: 0
Start: 2024-08-10 | End: 2024-09-08

## 2024-08-11 LAB
CULTURE RESULTS: ABNORMAL
CULTURE RESULTS: ABNORMAL
CULTURE RESULTS: SIGNIFICANT CHANGE UP
ORGANISM # SPEC MICROSCOPIC CNT: ABNORMAL
SPECIMEN SOURCE: SIGNIFICANT CHANGE UP

## 2024-08-12 ENCOUNTER — NON-APPOINTMENT (OUTPATIENT)
Age: 44
End: 2024-08-12

## 2024-08-13 LAB
CULTURE RESULTS: SIGNIFICANT CHANGE UP
SPECIMEN SOURCE: SIGNIFICANT CHANGE UP

## 2024-08-21 ENCOUNTER — NON-APPOINTMENT (OUTPATIENT)
Age: 44
End: 2024-08-21

## 2024-08-21 PROBLEM — I10 ESSENTIAL (PRIMARY) HYPERTENSION: Chronic | Status: ACTIVE | Noted: 2024-08-06

## 2024-08-21 PROBLEM — E11.9 TYPE 2 DIABETES MELLITUS WITHOUT COMPLICATIONS: Chronic | Status: ACTIVE | Noted: 2024-08-06

## 2024-08-21 PROBLEM — E78.5 HYPERLIPIDEMIA, UNSPECIFIED: Chronic | Status: ACTIVE | Noted: 2024-08-06

## 2024-08-21 PROBLEM — E11.40 TYPE 2 DIABETES MELLITUS WITH DIABETIC NEUROPATHY, UNSPECIFIED: Chronic | Status: ACTIVE | Noted: 2024-08-06

## 2024-08-22 DIAGNOSIS — L97.522 NON-PRESSURE CHRONIC ULCER OF OTHER PART OF LEFT FOOT WITH FAT LAYER EXPOSED: ICD-10-CM

## 2024-08-22 RX ORDER — CEPHALEXIN 500 MG/1
500 CAPSULE ORAL 4 TIMES DAILY
Qty: 28 | Refills: 0 | Status: ACTIVE | COMMUNITY
Start: 2024-08-22 | End: 1900-01-01

## 2024-08-22 RX ORDER — CIPROFLOXACIN HYDROCHLORIDE 500 MG/1
500 TABLET, FILM COATED ORAL TWICE DAILY
Qty: 14 | Refills: 0 | Status: ACTIVE | COMMUNITY
Start: 2024-08-22 | End: 1900-01-01

## 2024-09-09 ENCOUNTER — APPOINTMENT (OUTPATIENT)
Dept: INFECTIOUS DISEASE | Facility: CLINIC | Age: 44
End: 2024-09-09
Payer: COMMERCIAL

## 2024-09-09 VITALS
SYSTOLIC BLOOD PRESSURE: 141 MMHG | HEART RATE: 104 BPM | WEIGHT: 245 LBS | TEMPERATURE: 98.6 F | DIASTOLIC BLOOD PRESSURE: 93 MMHG | OXYGEN SATURATION: 99 %

## 2024-09-09 PROCEDURE — 99213 OFFICE O/P EST LOW 20 MIN: CPT

## 2024-09-09 RX ORDER — CIPROFLOXACIN HYDROCHLORIDE 500 MG/1
500 TABLET, FILM COATED ORAL
Qty: 42 | Refills: 0 | Status: ACTIVE | COMMUNITY
Start: 2024-09-09 | End: 1900-01-01

## 2024-09-09 RX ORDER — CEPHALEXIN 500 MG/1
500 TABLET ORAL
Qty: 42 | Refills: 2 | Status: ACTIVE | COMMUNITY
Start: 2024-09-09 | End: 1900-01-01

## 2024-09-09 NOTE — HISTORY OF PRESENT ILLNESS
[FreeTextEntry1] : received 14 days of IV zosyn  and 17 days of cipro and Keflex  ( just fished 2 days ago) no systemic symptoms

## 2024-09-09 NOTE — ASSESSMENT
[FreeTextEntry1] : I am concerned about the ongoing drainage cultures reviewed and they were MSSA and cipro sensitive klebsiella and Enterobacter Initial scan was negative  repeat MRI restart cipro and keflex will discuss with podiatry next options

## 2024-09-09 NOTE — PHYSICAL EXAM
[General Appearance - Alert] : alert [General Appearance - In No Acute Distress] : in no acute distress [Sclera] : the sclera and conjunctiva were normal [PERRL With Normal Accommodation] : pupils were equal in size, round, reactive to light [Extraocular Movements] : extraocular movements were intact [Outer Ear] : the ears and nose were normal in appearance [Oropharynx] : the oropharynx was normal with no thrush [FreeTextEntry1] : open wd that had some possible purulence

## 2024-09-12 ENCOUNTER — TRANSCRIPTION ENCOUNTER (OUTPATIENT)
Age: 44
End: 2024-09-12

## 2024-09-16 ENCOUNTER — TRANSCRIPTION ENCOUNTER (OUTPATIENT)
Age: 44
End: 2024-09-16

## 2024-09-16 LAB — BACTERIA SPEC CULT: NORMAL

## 2024-10-01 ENCOUNTER — APPOINTMENT (OUTPATIENT)
Dept: MRI IMAGING | Facility: CLINIC | Age: 44
End: 2024-10-01
Payer: COMMERCIAL

## 2024-10-01 PROCEDURE — 73718 MRI LOWER EXTREMITY W/O DYE: CPT | Mod: RT

## 2024-10-08 ENCOUNTER — NON-APPOINTMENT (OUTPATIENT)
Age: 44
End: 2024-10-08

## 2024-10-08 RX ORDER — CEPHALEXIN 500 MG/1
500 CAPSULE ORAL 4 TIMES DAILY
Qty: 40 | Refills: 0 | Status: ACTIVE | COMMUNITY
Start: 2024-10-08 | End: 1900-01-01

## 2024-10-08 RX ORDER — CIPROFLOXACIN HYDROCHLORIDE 500 MG/1
500 TABLET, FILM COATED ORAL
Qty: 20 | Refills: 0 | Status: ACTIVE | COMMUNITY
Start: 2024-10-08 | End: 1900-01-01

## 2024-12-04 ENCOUNTER — EMERGENCY (EMERGENCY)
Facility: HOSPITAL | Age: 44
LOS: 1 days | Discharge: HOME | End: 2024-12-04
Attending: STUDENT IN AN ORGANIZED HEALTH CARE EDUCATION/TRAINING PROGRAM
Payer: COMMERCIAL

## 2024-12-04 VITALS
HEIGHT: 74 IN | WEIGHT: 259.93 LBS | DIASTOLIC BLOOD PRESSURE: 117 MMHG | TEMPERATURE: 98 F | OXYGEN SATURATION: 99 % | RESPIRATION RATE: 18 BRPM | HEART RATE: 118 BPM | SYSTOLIC BLOOD PRESSURE: 183 MMHG

## 2024-12-04 PROCEDURE — 99284 EMERGENCY DEPT VISIT MOD MDM: CPT

## 2024-12-05 VITALS
SYSTOLIC BLOOD PRESSURE: 175 MMHG | DIASTOLIC BLOOD PRESSURE: 80 MMHG | RESPIRATION RATE: 18 BRPM | OXYGEN SATURATION: 100 % | HEART RATE: 98 BPM | TEMPERATURE: 98 F

## 2024-12-05 PROCEDURE — 99283 EMERGENCY DEPT VISIT LOW MDM: CPT | Mod: 25

## 2024-12-05 PROCEDURE — 93005 ELECTROCARDIOGRAM TRACING: CPT

## 2024-12-05 PROCEDURE — 73630 X-RAY EXAM OF FOOT: CPT

## 2024-12-05 PROCEDURE — 73630 X-RAY EXAM OF FOOT: CPT | Mod: 26,LT

## 2024-12-05 RX ORDER — CEPHALEXIN 250 MG/1
500 CAPSULE ORAL ONCE
Refills: 0 | Status: COMPLETED | OUTPATIENT
Start: 2024-12-05 | End: 2024-12-05

## 2024-12-05 RX ORDER — ACETAMINOPHEN 500 MG/5ML
975 LIQUID (ML) ORAL ONCE
Refills: 0 | Status: COMPLETED | OUTPATIENT
Start: 2024-12-05 | End: 2024-12-05

## 2024-12-05 RX ORDER — CEPHALEXIN 250 MG/1
1 CAPSULE ORAL
Qty: 20 | Refills: 0
Start: 2024-12-05 | End: 2024-12-09

## 2024-12-05 RX ADMIN — CEPHALEXIN 500 MILLIGRAM(S): 250 CAPSULE ORAL at 02:00

## 2024-12-05 RX ADMIN — Medication 975 MILLIGRAM(S): at 02:00
